# Patient Record
Sex: FEMALE | Race: WHITE | Employment: OTHER | URBAN - METROPOLITAN AREA
[De-identification: names, ages, dates, MRNs, and addresses within clinical notes are randomized per-mention and may not be internally consistent; named-entity substitution may affect disease eponyms.]

---

## 2017-01-13 ENCOUNTER — HOSPITAL ENCOUNTER (OUTPATIENT)
Dept: LAB | Age: 82
Discharge: HOME | End: 2017-01-13
Payer: MEDICARE

## 2017-01-13 ENCOUNTER — OFFICE VISIT (OUTPATIENT)
Dept: INTERNAL MEDICINE CLINIC | Age: 82
End: 2017-01-13

## 2017-01-13 VITALS
SYSTOLIC BLOOD PRESSURE: 109 MMHG | WEIGHT: 147 LBS | OXYGEN SATURATION: 94 % | TEMPERATURE: 98.2 F | HEIGHT: 66 IN | RESPIRATION RATE: 16 BRPM | HEART RATE: 78 BPM | BODY MASS INDEX: 23.63 KG/M2 | DIASTOLIC BLOOD PRESSURE: 61 MMHG

## 2017-01-13 DIAGNOSIS — I10 ESSENTIAL HYPERTENSION: ICD-10-CM

## 2017-01-13 DIAGNOSIS — R19.7 DIARRHEA, UNSPECIFIED TYPE: Chronic | ICD-10-CM

## 2017-01-13 DIAGNOSIS — E87.6 HYPOKALEMIA: ICD-10-CM

## 2017-01-13 DIAGNOSIS — G30.1 LATE ONSET ALZHEIMER'S DISEASE WITHOUT BEHAVIORAL DISTURBANCE (HCC): Primary | ICD-10-CM

## 2017-01-13 DIAGNOSIS — F02.80 LATE ONSET ALZHEIMER'S DISEASE WITHOUT BEHAVIORAL DISTURBANCE (HCC): Primary | ICD-10-CM

## 2017-01-13 DIAGNOSIS — F32.A DEPRESSION, UNSPECIFIED DEPRESSION TYPE: Chronic | ICD-10-CM

## 2017-01-13 DIAGNOSIS — R63.4 WEIGHT LOSS: ICD-10-CM

## 2017-01-13 PROCEDURE — 80061 LIPID PANEL: CPT

## 2017-01-13 PROCEDURE — 83735 ASSAY OF MAGNESIUM: CPT

## 2017-01-13 PROCEDURE — 84134 ASSAY OF PREALBUMIN: CPT

## 2017-01-13 PROCEDURE — 85025 COMPLETE CBC W/AUTO DIFF WBC: CPT

## 2017-01-13 PROCEDURE — 36415 COLL VENOUS BLD VENIPUNCTURE: CPT

## 2017-01-13 PROCEDURE — 80053 COMPREHEN METABOLIC PANEL: CPT

## 2017-01-13 RX ORDER — POTASSIUM CHLORIDE 750 MG/1
TABLET, FILM COATED, EXTENDED RELEASE ORAL
Qty: 30 TAB | Refills: 0 | Status: SHIPPED | OUTPATIENT
Start: 2017-01-13 | End: 2017-02-24 | Stop reason: SDUPTHER

## 2017-01-13 NOTE — MR AVS SNAPSHOT
Visit Information Date & Time Provider Department Dept. Phone Encounter #  
 1/13/2017  2:00 PM Nimco Maddox, [facility] [address] [phone] Follow-up Instructions Return in about 3 months (around 4/13/2017). Upcoming Health Maintenance Date Due ZOSTER VACCINE AGE 60> 4/23/1997 GLAUCOMA SCREENING Q2Y 4/23/2002 MEDICARE YEARLY EXAM 11/30/2016 Pneumococcal 65+ Low/Medium Risk (2 of 2 - PCV13) 1/27/2017 DTaP/Tdap/Td series (2 - Td) 1/27/2026 Allergies as of 1/13/2017  Review Complete On: 1/13/2017 By: Shyam Mercury III, DO Severity Noted Reaction Type Reactions Ciprofloxacin  08/05/2011    Itching Ditropan [Oxybutynin Chloride]  08/16/2011    Itching Lisinopril  08/05/2011    Itching Sulfa (Sulfonamide Antibiotics)  08/05/2011    Itching Toprol Xl [Metoprolol Succinate]  08/05/2011    Itching Current Immunizations  Never Reviewed Name Date Pneumococcal Polysaccharide (PPSV-23) 1/27/2016 Tdap 1/27/2016 Not reviewed this visit You Were Diagnosed With   
  
 Codes Comments Late onset Alzheimer's disease without behavioral disturbance    -  Primary ICD-10-CM: G30.1, F02.80 ICD-9-CM: 331.0, 294.10 Essential hypertension     ICD-10-CM: I10 
ICD-9-CM: 401.9 Weight loss     ICD-10-CM: R63.4 ICD-9-CM: 783.21 Depression, unspecified depression type     ICD-10-CM: F32.9 ICD-9-CM: 253 Diarrhea, unspecified type     ICD-10-CM: R19.7 ICD-9-CM: 787.91 Hypokalemia     ICD-10-CM: E87.6 ICD-9-CM: 276.8 Vitals BP Pulse Temp Resp Height(growth percentile) Weight(growth percentile) 109/61 (BP 1 Location: Left arm, BP Patient Position: Sitting) 78 98.2 °F (36.8 °C) (Oral) 16 5' 5.5\" (1.664 m) 147 lb (66.7 kg) SpO2 BMI OB Status Smoking Status 94% 24.09 kg/m2 Postmenopausal Never Smoker Vitals History BMI and BSA Data Body Mass Index Body Surface Area 24.09 kg/m 2 1.76 m 2 Preferred Pharmacy Pharmacy Name Phone [address], [address] [place] [address] [place] [phone] Your Updated Medication List  
  
   
This list is accurate as of: 1/13/17  2:54 PM.  Always use your most recent med list. amLODIPine 5 mg tablet Commonly known as:  Kyra Quesada TAKE 1 TABLET BY MOUTH DAILY FOR BLOOD PRESSURE. ARICEPT 10 mg tablet Generic drug:  donepezil Take 10 mg by mouth nightly. CALCIUM 500 PO Take  by mouth daily. escitalopram oxalate 10 mg tablet Commonly known as:  Heath Jefferson Take 1 Tab by mouth daily. fluorometholone 0.1 % ophthalmic suspension Commonly known as: [facility] Administer 1 Drop to both eyes two (2) times a day. hydroCHLOROthiazide 25 mg tablet Commonly known as:  HYDRODIURIL  
TAKE 1 TABLET BY MOUTH DAILY FOR BLOOD PRESSURE.  
  
 ibuprofen 200 mg tablet Commonly known as:  MOTRIN Take  by mouth.  
  
 meloxicam 15 mg tablet Commonly known as:  MOBIC Take 15 mg by mouth daily. memantine 5 mg tablet Commonly known as:  Lerma Brood TAKE 1 TABLET BY MOUTH DAILY. omega-3 fatty acids-vitamin e 1,000 mg Cap Take 1 Cap by mouth daily. potassium chloride SR 10 mEq tablet Commonly known as:  KLOR-CON 10  
TAKE 1 TABLET BY MOUTH DAILY FOR POTASSIUM. suvorexant 10 mg tablet Commonly known as:  Cher Crow Take 1 Tab by mouth nightly as needed for Insomnia. Max Daily Amount: 10 mg.  
  
 traMADol 50 mg tablet Commonly known as:  ULTRAM  
Take 1 Tab by mouth every six (6) hours as needed for Pain. Max Daily Amount: 200 mg. Indications: PAIN Dea Bhakta Commonly known as:  ULTRA-LIGHT ROLLATOR Dx: F03.90 M19.90 M25.551 Prescriptions Sent to Pharmacy Refills  
 potassium chloride SR (KLOR-CON 10) 10 mEq tablet 0 Sig: TAKE 1 TABLET BY MOUTH DAILY FOR POTASSIUM. Class: Normal  
 Pharmacy: [id number] [facility] Last  #: [phone] We Performed the Following CBC WITH AUTOMATED DIFF [41794 CPT(R)] LIPID PANEL [59483 CPT(R)] MAGNESIUM [id number] CPT(R)] METABOLIC PANEL, COMPREHENSIVE [85879 CPT(R)] PREALBUMIN [31647 CPT(R)] REFERRAL TO GASTROENTEROLOGY [QQG58 Custom] Follow-up Instructions Return in about 3 months (around 4/13/2017). Referral Information Referral ID Referred By Referred To  
  
 [id number] [facility] [address] [place] [address] Visits Status Start Date End Date 1 New Request 1/13/17 1/13/18 If your referral has a status of pending review or denied, additional information will be sent to support the outcome of this decision. Patient Instructions High-Calorie and High-Protein Diet: Care Instructions Your Care Instructions A high-calorie, high-protein diet gives you more energy and extra nutrition to help your body heal. Your doctor and dietitian can help you design a diet based on your health and what you prefer to eat. Always talk with your doctor or dietitian before you make changes in your diet. Follow-up care is a key part of your treatment and safety. Be sure to make and go to all appointments, and call your doctor if you are having problems. Its also a good idea to know your test results and keep a list of the medicines you take. How can you care for yourself at home? · Eat three meals a day, plus snacks in between and at bedtime. · Include favorite foods in your meals. This will help make meals more pleasant. · Drink your beverage at the end of the meal, because drinking before or during the meal can fill you up. · Eat high-protein foods, such as: ¨ Meat, fish, and poultry. ¨ Milk and milk products.  Add powdered milk to other foods (such as pudding or soups) to boost the protein. ¨ Eggs. ¨ Cooked dried beans and peas. ¨ Peanut butter, nuts, and seeds. ¨ Tofu. ¨ Cheeses. ¨ Protein bars. · Eat high-calorie foods, such as: 
¨ Butter, honey, and brown sugar, added to foods to make them taste better. ¨ Oils, sauces, and gravies. ¨ Peanut butter. ¨ Whole milk, yogurt, mayonnaise, and sour cream. 
¨ Granola cereal with fruit and granola bars. ¨ Muffins, pancakes, waffles, and other breads. ¨ Milkshakes, puddings, and custard. · Try high-energy drinks, such as Ensure, Boost, or instant breakfast drinks, if you have trouble eating solid foods. They will give you both calories and protein. Soups and smoothies also are good sources of nutrition. · Keep snacks around that are easy to eat, such as pudding, energy bars, ice cream, and flavored ice pops. · If you can, take a walk before you eat, to make you hungrier. · Do not waste energy eating foods that do not give you much nutrition, such as potato chips, candy bars, and soft drinks. · Drink plenty of fluids. If you have kidney, heart, or liver disease and have to limit fluids, talk with your doctor before you increase the amount of fluids you drink. Where can you learn more? Go to http://jodi-edin.info/. Enter C754 in the search box to learn more about \"High-Calorie and High-Protein Diet: Care Instructions. \" Current as of: July 26, 2016 Content Version: 11.1 © 3340-3526 [employer]. Care instructions adapted under license by [employer] (which disclaims liability or warranty for this information). If you have questions about a medical condition or this instruction, always ask your healthcare professional. [address] any warranty or liability for your use of this information. Introducing [facility]!    
 [employer] introduces I-Stand patient portal. Now you can access parts of your medical record, email your doctor's office, and request medication refills online. 1. In your internet browser, go to [email or web address] 2. Click on the First Time User? Click Here link in the Sign In box. You will see the New Member Sign Up page. 3. Enter your [employer] Access Code exactly as it appears below. You will not need to use this code after youve completed the sign-up process. If you do not sign up before the expiration date, you must request a new code. · [employer] Access Code: [email or web address] Expires: 4/13/2017  2:54 PM 
 
4. Enter the last four digits of your Social Security Number (xxxx) and Date of Birth (mm/dd/yyyy) as indicated and click Submit. You will be taken to the next sign-up page. 5. Create a [employer] ID. This will be your [employer] login ID and cannot be changed, so think of one that is secure and easy to remember. 6. Create a [employer] password. You can change your password at any time. 7. Enter your Password Reset Question and Answer. This can be used at a later time if you forget your password. 8. Enter your e-mail address. You will receive e-mail notification when new information is available in [address] 9. Click Sign Up. You can now view and download portions of your medical record. 10. Click the Download Summary menu link to download a portable copy of your medical information. If you have questions, please visit the Frequently Asked Questions section of the [employer] website. Remember, [employer] is NOT to be used for urgent needs. For medical emergencies, dial 911. Now available from your iPhone and Android! Please provide this summary of care documentation to your next provider. Your primary care clinician is listed as Karrie Cotto If you have any questions after today's visit, please call [phone].

## 2017-01-13 NOTE — PROGRESS NOTES
Reviewed record in preparation for visit and have obtained necessary documentation. Identified pt with two pt identifiers(name and ). Chief Complaint   Patient presents with    Hypertension     follow up       Health Maintenance Due   Topic Date Due    ZOSTER VACCINE AGE 60>  1997    GLAUCOMA SCREENING Q2Y  2002    MEDICARE YEARLY EXAM  2016       Ms. Abel has a reminder for a \"due or due soon\" health maintenance. I have asked that she discuss health maintenance topic(s) due with Her  primary care provider. Coordination of Care Questionnaire:  :     1) Have you been to an emergency room, urgent care clinic since your last visit? no   Hospitalized since your last visit? no             2) Have you seen or consulted any other health care providers outside of [address] [place] [address] since your last visit? no  (Include any pap smears or colon screenings in this section.)      Patient is accompanied by self I have received verbal consent from [place] to discuss any/all medical information while they are present in the room.

## 2017-01-13 NOTE — PROGRESS NOTES
Jose Luis Morfin is a 80 y.o. female who presents for evaluation of routine follow up. Last seen by me oct 13, started lexapro then for depression, which has helped nicely. Has lost another 10 lbs since then though. Not much appetite, and often forgets to eat. Denies any pains, cough or other symptoms. Much less diarrhea as well.       ROS:  Constitutional: negative for fevers, chills, anorexia and weight loss  Eyes:   negative for visual disturbance and irritation  ENT:   negative for tinnitus,sore throat,nasal congestion,ear pain,hoarseness  Respiratory:  negative for cough, hemoptysis, dyspnea,wheezing  CV:   negative for chest pain, palpitations, lower extremity edema  GI:   negative for nausea, vomiting,  abdominal pain,melena.   ++diarrhea, but much less  Genitourinary: negative for frequency, dysuria and hematuria  Musculoskel: negative for myalgias, arthralgias, back pain, muscle weakness, joint pain  Neurological:  negative for headaches, dizziness, focal weakness, numbness  Psychiatric:     Negative for depression or anxiety      Past Medical History   Diagnosis Date    Arthritis      hands    Hypertension     Incontinence of urine     Other ill-defined conditions(799.89)      freq UTIs    Thromboembolus ([facility] [address] 5-2011     left leg    Unspecified adverse effect of anesthesia      PONV       Past Surgical History   Procedure Laterality Date    Vascular surgery procedure unlist  6-2011     left leg vein procedure DVT    Pr breast surgery procedure unlisted       right breast bx x2    Hx urological  2006     bladder sling    Hx cholecystectomy      Hx cataract removal  2008     oze       Family History   Problem Relation Age of Onset    Cancer Father      lung, prostate    Heart Disease Brother     No Known Problems Mother        Social History     Social History    Marital status:      Spouse name: N/A    Number of children: N/A    Years of education: N/A     Occupational History    Not on file. Social History Main Topics    Smoking status: Never Smoker    Smokeless tobacco: Not on file    Alcohol use No    Drug use: No    Sexual activity: No     Other Topics Concern    Not on file     Social History Narrative            Visit Vitals    /61 (BP 1 Location: Left arm, BP Patient Position: Sitting)    Pulse 78    Temp 98.2 °F (36.8 °C) (Oral)    Resp 16    Ht 5' 5.5\" (1.664 m)    Wt 147 lb (66.7 kg)    SpO2 94%    BMI 24.09 kg/m2       Physical Examination:   General - Well appearing female  HEENT - PERRL, TM no erythema/opacification, normal nasal turbinates, no oropharyngeal erythema or exudate, MMM  Neck - supple, no bruits, no thyroidomegaly, no lymphadenopathy  Pulm - clear to auscultation bilaterally  Cardio - RRR, normal S1 S2, no murmur  Abd - soft, nontender, no masses, no HSM  Extrem - no edema, +2 distal pulses  Neuro-  No focal deficits, CN intact     Assessment/Plan:    1. Continued weight loss--check cbc, cmp, prealbumin. Denies any dysphagia, odynophagia or other complaints. Had ct abd/pelvis that was normal.  2.  Diarrhea--much improved, but persists. Doubt the cause of her weight loss, but will refer to gi for any further thoughts. 3.  htn--continue norvasc, hctz  4. Depression--much improved with lexapro  5. Dementia--continue aricept and namenda  6.   Hypokalemia--check cmp, mg    rtc 3 months        Tiara Gains III, DO

## 2017-01-13 NOTE — LETTER
2/10/2017 8:51 AM 
 
Ms. Garcia [address] [id number] Dear Tl Duran: 
 
Please find your most recent results below. Resulted Orders CBC WITH AUTOMATED DIFF Result Value Ref Range WBC 8.2 3.4 - 10.8 x10E3/uL  
 RBC 5.10 3.77 - 5.28 x10E6/uL HGB 15.3 11.1 - 15.9 g/dL HCT 45.1 34.0 - 46.6 % MCV 88 79 - 97 fL  
 MCH 30.0 26.6 - 33.0 pg  
 MCHC 33.9 31.5 - 35.7 g/dL  
 RDW 14.1 12.3 - 15.4 % PLATELET 619 158 - 409 x10E3/uL NEUTROPHILS 69 % Lymphocytes 19 % MONOCYTES 10 % EOSINOPHILS 1 % BASOPHILS 1 %  
 ABS. NEUTROPHILS 5.7 1.4 - 7.0 x10E3/uL Abs Lymphocytes 1.6 0.7 - 3.1 x10E3/uL  
 ABS. MONOCYTES 0.8 0.1 - 0.9 x10E3/uL  
 ABS. EOSINOPHILS 0.1 0.0 - 0.4 x10E3/uL  
 ABS. BASOPHILS 0.1 0.0 - 0.2 x10E3/uL IMMATURE GRANULOCYTES 0 %  
 ABS. IMM. GRANS. 0.0 0.0 - 0.1 x10E3/uL Narrative Performed at:  [address]  [id number] : Usha Santizo MD, Phone:  [phone] METABOLIC PANEL, COMPREHENSIVE Result Value Ref Range Glucose 136 (H) 65 - 99 mg/dL BUN 13 8 - 27 mg/dL Creatinine 1.06 (H) 0.57 - 1.00 mg/dL GFR est non-AA 50 (L) >59 mL/min/1.73 GFR est AA 58 (L) >59 mL/min/1.73  
 BUN/Creatinine ratio 12 11 - 26 Sodium 145 (H) 134 - 144 mmol/L Potassium 3.4 (L) 3.5 - 5.2 mmol/L Chloride 101 96 - 106 mmol/L  
 CO2 26 18 - 29 mmol/L Calcium 9.4 8.7 - 10.3 mg/dL Protein, total 6.2 6.0 - 8.5 g/dL Albumin 3.5 3.5 - 4.8 g/dL GLOBULIN, TOTAL 2.7 1.5 - 4.5 g/dL A-G Ratio 1.3 1.1 - 2.5 Bilirubin, total 0.5 0.0 - 1.2 mg/dL Alk. phosphatase 86 39 - 117 IU/L  
 AST (SGOT) 33 0 - 40 IU/L  
 ALT (SGPT) 18 0 - 32 IU/L Narrative Performed at:  [address]  [id number] : Usha Santizo MD, Phone:  [phone] LIPID PANEL Result Value Ref Range Cholesterol, total 192 100 - 199 mg/dL Triglyceride 129 0 - 149 mg/dL HDL Cholesterol 48 >39 mg/dL VLDL, calculated 26 5 - 40 mg/dL LDL, calculated 118 (H) 0 - 99 mg/dL Narrative Performed at:  [address]  [id number] : Kiki Greene MD, Phone:  [phone] MAGNESIUM Result Value Ref Range Magnesium 2.3 1.6 - 2.3 mg/dL Narrative Performed at:  [address]  [id number] : Kiki Greene MD, Phone:  [phone] PREALBUMIN Result Value Ref Range Prealbumin 9 9 - 32 mg/dL Narrative Performed at:  [address]  [id number] : Kiki Greene MD, Phone:  [phone] RECOMMENDATIONS: 
We have attempted to contact you by phone regarding your results. Labs look pretty good, nothing to explain the weight loss. Potassium is just below normal at 3.4. If she is taking a supplement continue it, if she is not taking the supplement, i think that is ok as well. Otherwise, continue all meds same. Please call me if you have any questions: [phone] Sincerely, Leatha Sandoval III, DO

## 2017-01-13 NOTE — PATIENT INSTRUCTIONS
High-Calorie and High-Protein Diet: Care Instructions  Your Care Instructions  A high-calorie, high-protein diet gives you more energy and extra nutrition to help your body heal. Your doctor and dietitian can help you design a diet based on your health and what you prefer to eat. Always talk with your doctor or dietitian before you make changes in your diet. Follow-up care is a key part of your treatment and safety. Be sure to make and go to all appointments, and call your doctor if you are having problems. Its also a good idea to know your test results and keep a list of the medicines you take. How can you care for yourself at home? · Eat three meals a day, plus snacks in between and at bedtime. · Include favorite foods in your meals. This will help make meals more pleasant. · Drink your beverage at the end of the meal, because drinking before or during the meal can fill you up. · Eat high-protein foods, such as:  ¨ Meat, fish, and poultry. ¨ Milk and milk products. Add powdered milk to other foods (such as pudding or soups) to boost the protein. ¨ Eggs. ¨ Cooked dried beans and peas. ¨ Peanut butter, nuts, and seeds. ¨ Tofu. ¨ Cheeses. ¨ Protein bars. · Eat high-calorie foods, such as:  ¨ Butter, honey, and brown sugar, added to foods to make them taste better. ¨ Oils, sauces, and gravies. ¨ Peanut butter. ¨ Whole milk, yogurt, mayonnaise, and sour cream.  ¨ Granola cereal with fruit and granola bars. ¨ Muffins, pancakes, waffles, and other breads. ¨ Milkshakes, puddings, and custard. · Try high-energy drinks, such as Ensure, Boost, or instant breakfast drinks, if you have trouble eating solid foods. They will give you both calories and protein. Soups and smoothies also are good sources of nutrition. · Keep snacks around that are easy to eat, such as pudding, energy bars, ice cream, and flavored ice pops. · If you can, take a walk before you eat, to make you hungrier.   · Do not waste energy eating foods that do not give you much nutrition, such as potato chips, candy bars, and soft drinks. · Drink plenty of fluids. If you have kidney, heart, or liver disease and have to limit fluids, talk with your doctor before you increase the amount of fluids you drink. Where can you learn more? Go to http://jodi-edin.info/. Enter L814 in the search box to learn more about \"High-Calorie and High-Protein Diet: Care Instructions. \"  Current as of: July 26, 2016  Content Version: 11.1  © 1147-6132 [employer]. Care instructions adapted under license by [employer] (which disclaims liability or warranty for this information). If you have questions about a medical condition or this instruction, always ask your healthcare professional. [address] any warranty or liability for your use of this information.

## 2017-01-14 LAB
ALBUMIN SERPL-MCNC: 3.5 G/DL (ref 3.5–4.8)
ALBUMIN/GLOB SERPL: 1.3 {RATIO} (ref 1.1–2.5)
ALP SERPL-CCNC: 86 IU/L (ref 39–117)
ALT SERPL-CCNC: 18 IU/L (ref 0–32)
AST SERPL-CCNC: 33 IU/L (ref 0–40)
BASOPHILS # BLD AUTO: 0.1 X10E3/UL (ref 0–0.2)
BASOPHILS NFR BLD AUTO: 1 %
BILIRUB SERPL-MCNC: 0.5 MG/DL (ref 0–1.2)
BUN SERPL-MCNC: 13 MG/DL (ref 8–27)
BUN/CREAT SERPL: 12 (ref 11–26)
CALCIUM SERPL-MCNC: 9.4 MG/DL (ref 8.7–10.3)
CHLORIDE SERPL-SCNC: 101 MMOL/L (ref 96–106)
CHOLEST SERPL-MCNC: 192 MG/DL (ref 100–199)
CO2 SERPL-SCNC: 26 MMOL/L (ref 18–29)
CREAT SERPL-MCNC: 1.06 MG/DL (ref 0.57–1)
EOSINOPHIL # BLD AUTO: 0.1 X10E3/UL (ref 0–0.4)
EOSINOPHIL NFR BLD AUTO: 1 %
ERYTHROCYTE [DISTWIDTH] IN BLOOD BY AUTOMATED COUNT: 14.1 % (ref 12.3–15.4)
GLOBULIN SER CALC-MCNC: 2.7 G/DL (ref 1.5–4.5)
GLUCOSE SERPL-MCNC: 136 MG/DL (ref 65–99)
HCT VFR BLD AUTO: 45.1 % (ref 34–46.6)
HDLC SERPL-MCNC: 48 MG/DL
HGB BLD-MCNC: 15.3 G/DL (ref 11.1–15.9)
IMM GRANULOCYTES # BLD: 0 X10E3/UL (ref 0–0.1)
IMM GRANULOCYTES NFR BLD: 0 %
LDLC SERPL CALC-MCNC: 118 MG/DL (ref 0–99)
LYMPHOCYTES # BLD AUTO: 1.6 X10E3/UL (ref 0.7–3.1)
LYMPHOCYTES NFR BLD AUTO: 19 %
MAGNESIUM SERPL-MCNC: 2.3 MG/DL (ref 1.6–2.3)
MCH RBC QN AUTO: 30 PG (ref 26.6–33)
MCHC RBC AUTO-ENTMCNC: 33.9 G/DL (ref 31.5–35.7)
MCV RBC AUTO: 88 FL (ref 79–97)
MONOCYTES # BLD AUTO: 0.8 X10E3/UL (ref 0.1–0.9)
MONOCYTES NFR BLD AUTO: 10 %
NEUTROPHILS # BLD AUTO: 5.7 X10E3/UL (ref 1.4–7)
NEUTROPHILS NFR BLD AUTO: 69 %
PLATELET # BLD AUTO: 226 X10E3/UL (ref 150–379)
POTASSIUM SERPL-SCNC: 3.4 MMOL/L (ref 3.5–5.2)
PREALB SERPL-MCNC: 9 MG/DL (ref 9–32)
PROT SERPL-MCNC: 6.2 G/DL (ref 6–8.5)
RBC # BLD AUTO: 5.1 X10E6/UL (ref 3.77–5.28)
SODIUM SERPL-SCNC: 145 MMOL/L (ref 134–144)
TRIGL SERPL-MCNC: 129 MG/DL (ref 0–149)
VLDLC SERPL CALC-MCNC: 26 MG/DL (ref 5–40)
WBC # BLD AUTO: 8.2 X10E3/UL (ref 3.4–10.8)

## 2017-01-15 NOTE — PROGRESS NOTES
Labs look pretty good, nothing to explain the weight loss. Potassium is just below normal at 3.4. If she is taking a supplement continue it, if she is not taking the supplement, i think that is ok as well. Otherwise, continue all meds same.

## 2017-01-19 ENCOUNTER — DOCUMENTATION ONLY (OUTPATIENT)
Dept: SURGERY | Age: 82
End: 2017-01-19

## 2017-01-27 ENCOUNTER — HOSPITAL ENCOUNTER (OUTPATIENT)
Dept: LAB | Age: 82
Discharge: HOME | End: 2017-01-27
Payer: MEDICARE

## 2017-01-27 ENCOUNTER — OFFICE VISIT (OUTPATIENT)
Dept: INTERNAL MEDICINE CLINIC | Age: 82
End: 2017-01-27

## 2017-01-27 VITALS
RESPIRATION RATE: 16 BRPM | TEMPERATURE: 97.9 F | OXYGEN SATURATION: 94 % | HEART RATE: 98 BPM | HEIGHT: 66 IN | DIASTOLIC BLOOD PRESSURE: 62 MMHG | BODY MASS INDEX: 23.14 KG/M2 | WEIGHT: 144 LBS | SYSTOLIC BLOOD PRESSURE: 106 MMHG

## 2017-01-27 DIAGNOSIS — N39.0 URINARY TRACT INFECTION WITHOUT HEMATURIA, SITE UNSPECIFIED: Primary | ICD-10-CM

## 2017-01-27 LAB
BILIRUB UR QL STRIP: NEGATIVE
GLUCOSE UR-MCNC: NEGATIVE MG/DL
KETONES P FAST UR STRIP-MCNC: NORMAL MG/DL
PH UR STRIP: 6.5 [PH] (ref 4.6–8)
PROT UR QL STRIP: NORMAL MG/DL
SP GR UR STRIP: 1.02 (ref 1–1.03)
UA UROBILINOGEN AMB POC: NORMAL (ref 0.2–1)
URINALYSIS CLARITY POC: NORMAL
URINALYSIS COLOR POC: YELLOW
URINE BLOOD POC: NEGATIVE
URINE LEUKOCYTES POC: NORMAL
URINE NITRITES POC: POSITIVE

## 2017-01-27 PROCEDURE — 87086 URINE CULTURE/COLONY COUNT: CPT

## 2017-01-27 RX ORDER — NITROFURANTOIN 25; 75 MG/1; MG/1
100 CAPSULE ORAL 2 TIMES DAILY
Qty: 10 CAP | Refills: 0 | Status: SHIPPED | OUTPATIENT
Start: 2017-01-27 | End: 2017-02-01

## 2017-01-27 NOTE — PATIENT INSTRUCTIONS
Urinary Tract Infection in Women: Care Instructions  Your Care Instructions    A urinary tract infection, or UTI, is a general term for an infection anywhere between the kidneys and the urethra (where urine comes out). Most UTIs are bladder infections. They often cause pain or burning when you urinate. UTIs are caused by bacteria and can be cured with antibiotics. Be sure to complete your treatment so that the infection goes away. Follow-up care is a key part of your treatment and safety. Be sure to make and go to all appointments, and call your doctor if you are having problems. It's also a good idea to know your test results and keep a list of the medicines you take. How can you care for yourself at home? · Take your antibiotics as directed. Do not stop taking them just because you feel better. You need to take the full course of antibiotics. · Drink extra water and other fluids for the next day or two. This may help wash out the bacteria that are causing the infection. (If you have kidney, heart, or liver disease and have to limit fluids, talk with your doctor before you increase your fluid intake.)  · Avoid drinks that are carbonated or have caffeine. They can irritate the bladder. · Urinate often. Try to empty your bladder each time. · To relieve pain, take a hot bath or lay a heating pad set on low over your lower belly or genital area. Never go to sleep with a heating pad in place. To prevent UTIs  · Drink plenty of water each day. This helps you urinate often, which clears bacteria from your system. (If you have kidney, heart, or liver disease and have to limit fluids, talk with your doctor before you increase your fluid intake.)  · Consider adding cranberry juice to your diet. · Urinate when you need to. · Urinate right after you have sex. · Change sanitary pads often. · Avoid douches, bubble baths, feminine hygiene sprays, and other feminine hygiene products that have deodorants.   · After going to the bathroom, wipe from front to back. When should you call for help? Call your doctor now or seek immediate medical care if:  · Symptoms such as fever, chills, nausea, or vomiting get worse or appear for the first time. · You have new pain in your back just below your rib cage. This is called flank pain. · There is new blood or pus in your urine. · You have any problems with your antibiotic medicine. Watch closely for changes in your health, and be sure to contact your doctor if:  · You are not getting better after taking an antibiotic for 2 days. · Your symptoms go away but then come back. Where can you learn more? Go to http://jodi-edin.info/. Enter W547 in the search box to learn more about \"Urinary Tract Infection in Women: Care Instructions. \"  Current as of: August 12, 2016  Content Version: 11.1  © 3480-0185 [employer]Closed). Care instructions adapted under license by [employer] (which disclaims liability or warranty for this information). If you have questions about a medical condition or this instruction, always ask your healthcare professional. [address] any warranty or liability for your use of this information.

## 2017-01-27 NOTE — PROGRESS NOTES
Raoul Gamble is a 80 y.o. female who presents for evaluation of increased lethargy. Accompanied with daughter, who also states that pt has been using more toilet paper than usual recently. ROS:  Constitutional: negative for fevers, chills, anorexia and weight loss  Eyes:   negative for visual disturbance and irritation  ENT:   negative for tinnitus,sore throat,nasal congestion,ear pain,hoarseness  Respiratory:  negative for cough, hemoptysis, dyspnea,wheezing  CV:   negative for chest pain, palpitations, lower extremity edema  GI:   negative for nausea, vomiting, diarrhea, abdominal pain,melena  Genitourinary: negative for frequency, dysuria and hematuria  Musculoskel: negative for myalgias, arthralgias, back pain, muscle weakness, joint pain  Neurological:  negative for headaches, dizziness, focal weakness, numbness  Psychiatric:     Negative for depression or anxiety      Past Medical History   Diagnosis Date    Arthritis      hands    Hypertension     Incontinence of urine     Other ill-defined conditions(799.89)      freq UTIs    Thromboembolus ([facility] [address] 5-2011     left leg    Unspecified adverse effect of anesthesia      PONV       Past Surgical History   Procedure Laterality Date    Vascular surgery procedure unlist  6-2011     left leg vein procedure DVT    Pr breast surgery procedure unlisted       right breast bx x2    Hx urological  2006     bladder sling    Hx cholecystectomy      Hx cataract removal  2008     zoe       Family History   Problem Relation Age of Onset    Cancer Father      lung, prostate    Heart Disease Brother     No Known Problems Mother        Social History     Social History    Marital status:      Spouse name: N/A    Number of children: N/A    Years of education: N/A     Occupational History    Not on file.      Social History Main Topics    Smoking status: Never Smoker    Smokeless tobacco: Never Used    Alcohol use No    Drug use: No    Sexual activity: No     Other Topics Concern    Not on file     Social History Narrative            Visit Vitals    /62 (BP 1 Location: Left arm, BP Patient Position: Sitting)    Pulse 98    Temp 97.9 °F (36.6 °C) (Oral)    Resp 16    Ht 5' 5.5\" (1.664 m)    Wt 144 lb (65.3 kg)    SpO2 94%    BMI 23.6 kg/m2       Physical Examination:   General - Well appearing female  HEENT - PERRL, TM no erythema/opacification, normal nasal turbinates, no oropharyngeal erythema or exudate, MMM  Neck - supple, no bruits, no thyroidomegaly, no lymphadenopathy  Pulm - clear to auscultation bilaterally  Cardio - RRR, normal S1 S2, no murmur  Abd - soft, nontender, no masses, no HSM  Extrem - no edema, +2 distal pulses  Neuro-  No focal deficits, CN intact     Assessment/Plan:    1.  uti--send cx. rx for macrobid. 2. Lethargy--suspect uti contributing. 3.  Dementia--continue namenda    rtc 3 months for regular visit.         Lawrence Pap III, DO

## 2017-01-27 NOTE — PROGRESS NOTES
Reviewed record in preparation for visit and have obtained necessary documentation. Identified pt with two pt identifiers(name and ). No chief complaint on file. Health Maintenance Due   Topic Date Due    GLAUCOMA SCREENING Q2Y  2002    MEDICARE YEARLY EXAM  2016    Pneumococcal 65+ Low/Medium Risk (2 of 2 - PCV13) 2017       Ms. Abel has a reminder for a \"due or due soon\" health maintenance. I have asked that she discuss health maintenance topic(s) due with Her  primary care provider. Coordination of Care Questionnaire:  :     1) Have you been to an emergency room, urgent care clinic since your last visit? no   Hospitalized since your last visit? no             2) Have you seen or consulted any other health care providers outside of [address] [place] [address] since your last visit? no  (Include any pap smears or colon screenings in this section.)      Patient is accompanied by patient I have received verbal consent from [facility] to discuss any/all medical information while they are present in the room.

## 2017-01-29 LAB
BACTERIA UR CULT: NORMAL
BACTERIA UR CULT: NORMAL

## 2017-01-29 RX ORDER — AMOXICILLIN 500 MG/1
500 CAPSULE ORAL 2 TIMES DAILY
Qty: 20 CAP | Refills: 0 | Status: SHIPPED | OUTPATIENT
Start: 2017-01-29 | End: 2017-02-08

## 2017-02-01 NOTE — PROGRESS NOTES
I called and spoke with Taniya Shaver (on HIPPA). She was made aware of lab results and recommendations. She verbalized understanding.

## 2017-04-14 ENCOUNTER — HOSPITAL ENCOUNTER (OUTPATIENT)
Dept: LAB | Age: 82
Discharge: HOME | End: 2017-04-14
Payer: MEDICARE

## 2017-04-14 ENCOUNTER — OFFICE VISIT (OUTPATIENT)
Dept: INTERNAL MEDICINE CLINIC | Age: 82
End: 2017-04-14

## 2017-04-14 VITALS
SYSTOLIC BLOOD PRESSURE: 97 MMHG | TEMPERATURE: 98 F | HEART RATE: 78 BPM | DIASTOLIC BLOOD PRESSURE: 54 MMHG | BODY MASS INDEX: 23.63 KG/M2 | WEIGHT: 147 LBS | RESPIRATION RATE: 16 BRPM | HEIGHT: 66 IN | OXYGEN SATURATION: 94 %

## 2017-04-14 DIAGNOSIS — G30.1 LATE ONSET ALZHEIMER'S DISEASE WITHOUT BEHAVIORAL DISTURBANCE (HCC): ICD-10-CM

## 2017-04-14 DIAGNOSIS — R19.7 DIARRHEA, UNSPECIFIED TYPE: Chronic | ICD-10-CM

## 2017-04-14 DIAGNOSIS — N39.0 URINARY TRACT INFECTION WITHOUT HEMATURIA, SITE UNSPECIFIED: Primary | ICD-10-CM

## 2017-04-14 DIAGNOSIS — I10 ESSENTIAL HYPERTENSION: ICD-10-CM

## 2017-04-14 DIAGNOSIS — F32.A DEPRESSION, UNSPECIFIED DEPRESSION TYPE: Chronic | ICD-10-CM

## 2017-04-14 DIAGNOSIS — F02.80 LATE ONSET ALZHEIMER'S DISEASE WITHOUT BEHAVIORAL DISTURBANCE (HCC): ICD-10-CM

## 2017-04-14 LAB
BILIRUB UR QL STRIP: NEGATIVE
GLUCOSE UR-MCNC: NEGATIVE MG/DL
KETONES P FAST UR STRIP-MCNC: NEGATIVE MG/DL
PH UR STRIP: 7 [PH] (ref 4.6–8)
PROT UR QL STRIP: NORMAL MG/DL
SP GR UR STRIP: 1.02 (ref 1–1.03)
UA UROBILINOGEN AMB POC: NORMAL (ref 0.2–1)
URINALYSIS CLARITY POC: NORMAL
URINALYSIS COLOR POC: YELLOW
URINE BLOOD POC: NEGATIVE
URINE LEUKOCYTES POC: NORMAL
URINE NITRITES POC: NEGATIVE

## 2017-04-14 PROCEDURE — 87186 SC STD MICRODIL/AGAR DIL: CPT

## 2017-04-14 PROCEDURE — 87077 CULTURE AEROBIC IDENTIFY: CPT

## 2017-04-14 PROCEDURE — 87088 URINE BACTERIA CULTURE: CPT

## 2017-04-14 PROCEDURE — 87086 URINE CULTURE/COLONY COUNT: CPT

## 2017-04-14 RX ORDER — AMLODIPINE BESYLATE 5 MG/1
TABLET ORAL
Qty: 30 TAB | Refills: 9 | Status: SHIPPED | OUTPATIENT
Start: 2017-04-14

## 2017-04-14 RX ORDER — NITROFURANTOIN 25; 75 MG/1; MG/1
100 CAPSULE ORAL 2 TIMES DAILY
Qty: 6 CAP | Refills: 0 | Status: SHIPPED | OUTPATIENT
Start: 2017-04-14 | End: 2017-04-17

## 2017-04-14 RX ORDER — HYDROCHLOROTHIAZIDE 25 MG/1
TABLET ORAL
Qty: 30 TAB | Refills: 9 | Status: ON HOLD | OUTPATIENT
Start: 2017-04-14 | End: 2017-05-28

## 2017-04-14 NOTE — TELEPHONE ENCOUNTER
Call completed to Mary Jo Estradadaugher of patient), two patient identifiers verified. Eduarda Truong advised that all PA's are processed on receipt and to allow 3 to 5 business days for a response. Eduarda Truong verbalized an understanding.

## 2017-04-14 NOTE — TELEPHONE ENCOUNTER
Patient's daughter, Shanelle Meyers states she needs a call back today in reference to prescription written for IBS today needs Prior [employer] & they would like to get this medication today for the patient. Please call to advise.  Thank you

## 2017-04-14 NOTE — PROGRESS NOTES
Dov Allen is a 80 y.o. female who presents for evaluation of routine follow up. Last seen by me jan 27. Doing well, has gained 3 lbs. Biggest concern is continued diarrhea, has become explosive. 2-3x daily now. No bleeding noted. ROS:  Constitutional: negative for fevers, chills, anorexia and weight loss  Eyes:   negative for visual disturbance and irritation  ENT:   negative for tinnitus,sore throat,nasal congestion,ear pain,hoarseness  Respiratory:  negative for cough, hemoptysis, dyspnea,wheezing  CV:   negative for chest pain, palpitations, lower extremity edema  GI:   negative for nausea, vomiting, abdominal pain,melena.  ++diarrhea  Genitourinary: negative for frequency, dysuria and hematuria  Musculoskel: negative for myalgias, arthralgias, back pain, muscle weakness, joint pain  Neurological:  negative for headaches, dizziness, focal weakness, numbness  Psychiatric:     Negative for depression or anxiety      Past Medical History:   Diagnosis Date    Arthritis     hands    Hypertension     Incontinence of urine     Other ill-defined conditions     freq UTIs    Thromboembolus ([facility] [address] 5-2011    left leg    Unspecified adverse effect of anesthesia     PONV       Past Surgical History:   Procedure Laterality Date    BREAST SURGERY PROCEDURE UNLISTED      right breast bx x2    HX CATARACT REMOVAL  2008    zoe    HX CHOLECYSTECTOMY      HX UROLOGICAL  2006    bladder sling    VASCULAR SURGERY PROCEDURE UNLIST  6-2011    left leg vein procedure DVT       Family History   Problem Relation Age of Onset    Cancer Father      lung, prostate    Heart Disease Brother     No Known Problems Mother        Social History     Social History    Marital status:      Spouse name: N/A    Number of children: N/A    Years of education: N/A     Occupational History    Not on file.      Social History Main Topics    Smoking status: Never Smoker    Smokeless tobacco: Never Used   [facility] Alcohol use No    Drug use: No    Sexual activity: No     Other Topics Concern    Not on file     Social History Narrative            Visit Vitals    BP 97/54 (BP 1 Location: Left arm, BP Patient Position: Sitting)    Pulse 78    Temp 98 °F (36.7 °C) (Oral)    Resp 16    Ht 5' 5.5\" (1.664 m)    Wt 147 lb (66.7 kg)    SpO2 94%    BMI 24.09 kg/m2       Physical Examination:   General - Well appearing female  HEENT - PERRL, TM no erythema/opacification, normal nasal turbinates, no oropharyngeal erythema or exudate, MMM  Neck - supple, no bruits, no thyroidomegaly, no lymphadenopathy  Pulm - clear to auscultation bilaterally  Cardio - RRR, normal S1 S2, no murmur  Abd - soft, nontender, no masses, no HSM  Extrem - no edema, +2 distal pulses  Neuro-  No focal deficits, CN intact     Assessment/Plan:    1.  htn--bit low today, continue to monitor weekly. If less than 100/, would stop the hctz and just continue with norvasc  2. Diarrhea--try viberzi. Has appt with gi, dr Weldon Schilder next week  3.  uti--send cx, start macrobid  4.   Dementia--continue aricept and namenda    rtc 3 months        Haley Leos III, DO

## 2017-04-14 NOTE — PROGRESS NOTES
Reviewed record in preparation for visit and have obtained necessary documentation. Identified pt with two pt identifiers(name and ). Chief Complaint   Patient presents with    Hypertension     follow up    Fatigue       Health Maintenance Due   Topic Date Due    GLAUCOMA SCREENING Q2Y  2002    MEDICARE YEARLY EXAM  2016    Pneumococcal 65+ Low/Medium Risk (2 of 2 - PCV13) 2017       Ms. Abel has a reminder for a \"due or due soon\" health maintenance. I have asked that she discuss health maintenance topic(s) due with Her  primary care provider. Coordination of Care Questionnaire:  :     1) Have you been to an emergency room, urgent care clinic since your last visit? no   Hospitalized since your last visit? no             2) Have you seen or consulted any other health care providers outside of [address] [place] [address] since your last visit? no  (Include any pap smears or colon screenings in this section.)      Patient is accompanied by self I have received verbal consent from [place] to discuss any/all medical information while they are present in the room.

## 2017-04-14 NOTE — PATIENT INSTRUCTIONS
Diarrhea: Care Instructions  Your Care Instructions    Diarrhea is loose, watery stools (bowel movements). The exact cause is often hard to find. Sometimes diarrhea is your body's way of getting rid of what caused an upset stomach. Viruses, food poisoning, and many medicines can cause diarrhea. Some people get diarrhea in response to emotional stress, anxiety, or certain foods. Almost everyone has diarrhea now and then. It usually isn't serious, and your stools will return to normal soon. The important thing to do is replace the fluids you have lost, so you can prevent dehydration. The doctor has checked you carefully, but problems can develop later. If you notice any problems or new symptoms, get medical treatment right away. Follow-up care is a key part of your treatment and safety. Be sure to make and go to all appointments, and call your doctor if you are having problems. It's also a good idea to know your test results and keep a list of the medicines you take. How can you care for yourself at home? · Watch for signs of dehydration, which means your body has lost too much water. Dehydration is a serious condition and should be treated right away. Signs of dehydration are:  ¨ Increasing thirst and dry eyes and mouth. ¨ Feeling faint or lightheaded. ¨ Darker urine, and a smaller amount of urine than normal.  · To prevent dehydration, drink plenty of fluids, enough so that your urine is light yellow or clear like water. Choose water and other caffeine-free clear liquids until you feel better. If you have kidney, heart, or liver disease and have to limit fluids, talk with your doctor before you increase the amount of fluids you drink. · Begin eating small amounts of mild foods the next day, if you feel like it. ¨ Try yogurt that has live cultures of Lactobacillus. (Check the label.)  ¨ Avoid spicy foods, fruits, alcohol, and caffeine until 48 hours after all symptoms are gone.   ¨ Avoid chewing gum that contains sorbitol. ¨ Avoid dairy products (except for yogurt with Lactobacillus) while you have diarrhea and for 3 days after symptoms are gone. · The doctor may recommend that you take over-the-counter medicine, such as loperamide (Imodium), if you still have diarrhea after 6 hours. Read and follow all instructions on the label. Do not use this medicine if you have bloody diarrhea, a high fever, or other signs of serious illness. Call your doctor if you think you are having a problem with your medicine. When should you call for help? Call 911 anytime you think you may need emergency care. For example, call if:  · You passed out (lost consciousness). · Your stools are maroon or very bloody. Call your doctor now or seek immediate medical care if:  · You are dizzy or lightheaded, or you feel like you may faint. · Your stools are black and look like tar, or they have streaks of blood. · You have new or worse belly pain. · You have symptoms of dehydration, such as:  ¨ Dry eyes and a dry mouth. ¨ Passing only a little dark urine. ¨ Feeling thirstier than usual.  · You have a new or higher fever. Watch closely for changes in your health, and be sure to contact your doctor if:  · Your diarrhea is getting worse. · You see pus in the diarrhea. · You are not getting better after 2 days (48 hours). Where can you learn more? Go to http://jodi-edin.info/. Enter A413 in the search box to learn more about \"Diarrhea: Care Instructions. \"  Current as of: May 27, 2016  Content Version: 11.2  © 5182-2553 [employer]. Care instructions adapted under license by [employer] (which disclaims liability or warranty for this information). If you have questions about a medical condition or this instruction, always ask your healthcare professional. [address] any warranty or liability for your use of this information.   If blood pressure is less than 100/60, then stop the hctz and continue only with the norvasc.

## 2017-04-14 NOTE — MR AVS SNAPSHOT
Visit Information Date & Time Provider Department Dept. Phone Encounter #  
 4/14/2017 10:15 AM Sheron Vera, [address] [facility] [address] [phone] [id number] Follow-up Instructions Return in about 3 months (around 7/14/2017). Upcoming Health Maintenance Date Due  
 GLAUCOMA SCREENING Q2Y 4/23/2002 MEDICARE YEARLY EXAM 11/30/2016 Pneumococcal 65+ Low/Medium Risk (2 of 2 - PCV13) 1/27/2017 DTaP/Tdap/Td series (2 - Td) 1/27/2026 Allergies as of 4/14/2017  Review Complete On: 4/14/2017 By: Óscar Higgins III, DO Severity Noted Reaction Type Reactions Ciprofloxacin  08/05/2011    Itching Ditropan [Oxybutynin Chloride]  08/16/2011    Itching Lisinopril  08/05/2011    Itching Sulfa (Sulfonamide Antibiotics)  08/05/2011    Itching Toprol Xl [Metoprolol Succinate]  08/05/2011    Itching Current Immunizations  Never Reviewed Name Date Pneumococcal Polysaccharide (PPSV-23) 1/27/2016 Tdap 1/27/2016 Not reviewed this visit You Were Diagnosed With   
  
 Codes Comments Urinary tract infection without hematuria, site unspecified    -  Primary ICD-10-CM: N39.0 ICD-9-CM: 599.0 Diarrhea, unspecified type     ICD-10-CM: R19.7 ICD-9-CM: 787.91 Essential hypertension     ICD-10-CM: I10 
ICD-9-CM: 401.9 Late onset Alzheimer's disease without behavioral disturbance     ICD-10-CM: G30.1, F02.80 ICD-9-CM: 331.0, 294.10 Depression, unspecified depression type     ICD-10-CM: F32.9 ICD-9-CM: 935 Vitals BP Pulse Temp Resp Height(growth percentile) Weight(growth percentile) 97/54 (BP 1 Location: Left arm, BP Patient Position: Sitting) 78 98 °F (36.7 °C) (Oral) 16 5' 5.5\" (1.664 m) 147 lb (66.7 kg) SpO2 BMI OB Status Smoking Status 94% 24.09 kg/m2 Postmenopausal Never Smoker Vitals History BMI and BSA Data Body Mass Index Body Surface Area  24.09 kg/m 2 1.76 m 2  
  
  
 Preferred Pharmacy Pharmacy Name Phone [facility] [facility], [address] [place] [address] Irina Bay Said [phone] Your Updated Medication List  
  
   
This list is accurate as of: 4/14/17 11:14 AM.  Always use your most recent med list. amLODIPine 5 mg tablet Commonly known as:  Delphina Peat TAKE 1 TABLET BY MOUTH DAILY FOR BLOOD PRESSURE. CALCIUM 500 PO Take  by mouth daily. donepezil 10 mg tablet Commonly known as:  ARICEPT  
TAKE 1 TABLET BY MOUTH EVERY NIGHT. eluxadoline 75 mg tablet Commonly known as:  VIBERZI Take 1 Tab by mouth two (2) times daily (with meals). Max Daily Amount: 150 mg.  
  
 escitalopram oxalate 10 mg tablet Commonly known as:  Aliene Apo Take 1 Tab by mouth daily. fluorometholone 0.1 % ophthalmic suspension Commonly known as: [facility] Administer 1 Drop to both eyes two (2) times a day. hydroCHLOROthiazide 25 mg tablet Commonly known as:  HYDRODIURIL  
TAKE 1 TABLET BY MOUTH DAILY FOR BLOOD PRESSURE.  
  
 ibuprofen 200 mg tablet Commonly known as:  MOTRIN Take  by mouth.  
  
 meloxicam 15 mg tablet Commonly known as:  MOBIC Take 15 mg by mouth daily. memantine 5 mg tablet Commonly known as:  Dinah Dawson TAKE 1 TABLET BY MOUTH DAILY. nitrofurantoin (macrocrystal-monohydrate) 100 mg capsule Commonly known as:  MACROBID Take 1 Cap by mouth two (2) times a day for 3 days. omega-3 fatty acids-vitamin e 1,000 mg Cap Take 1 Cap by mouth daily. potassium chloride SR 10 mEq tablet Commonly known as:  KLOR-CON 10  
TAKE 1 TABLET BY MOUTH DAILY FOR POTASSIUM. suvorexant 10 mg tablet Commonly known as:  Lafaye Lack Take 1 Tab by mouth nightly as needed for Insomnia. Max Daily Amount: 10 mg.  
  
 traMADol 50 mg tablet Commonly known as:  ULTRAM  
Take 1 Tab by mouth every six (6) hours as needed for Pain. Max Daily Amount: 200 mg.  Indications: PAIN  
  
 Pauline Garcia Commonly known as:  ULTRA-LIGHT ROLLATOR Dx: F03.90 M19.90 M25.551 Prescriptions Printed Refills  
 eluxadoline (VIBERZI) 75 mg tablet 6 Sig: Take 1 Tab by mouth two (2) times daily (with meals). Max Daily Amount: 150 mg.  
 Class: Print Route: Oral  
  
Prescriptions Sent to Pharmacy Refills  
 amLODIPine (NORVASC) 5 mg tablet 9 Sig: TAKE 1 TABLET BY MOUTH DAILY FOR BLOOD PRESSURE. Class: Normal  
 Pharmacy: [facility] Kory Alarcon Ph #: [phone]  
 hydroCHLOROthiazide (HYDRODIURIL) 25 mg tablet 9 Sig: TAKE 1 TABLET BY MOUTH DAILY FOR BLOOD PRESSURE. Class: Normal  
 Pharmacy: [facility] K[facility] Alarcon Ph #: [phone]  
 nitrofurantoin, macrocrystal-monohydrate, (MACROBID) 100 mg capsule 0 Sig: Take 1 Cap by mouth two (2) times a day for 3 days. Class: Normal  
 Pharmacy: [facility] [address] at [address] [place] [address] Ph #: [phone] Route: Oral  
  
We Performed the Following CULTURE, URINE [id number] CPT(R)] Follow-up Instructions Return in about 3 months (around 7/14/2017). Patient Instructions Diarrhea: Care Instructions Your Care Instructions Diarrhea is loose, watery stools (bowel movements). The exact cause is often hard to find. Sometimes diarrhea is your body's way of getting rid of what caused an upset stomach. Viruses, food poisoning, and many medicines can cause diarrhea. Some people get diarrhea in response to emotional stress, anxiety, or certain foods. Almost everyone has diarrhea now and then. It usually isn't serious, and your stools will return to normal soon. The important thing to do is replace the fluids you have lost, so you can prevent dehydration. The doctor has checked you carefully, but problems can develop later.  If you notice any problems or new symptoms, get medical treatment right away. Follow-up care is a key part of your treatment and safety. Be sure to make and go to all appointments, and call your doctor if you are having problems. It's also a good idea to know your test results and keep a list of the medicines you take. How can you care for yourself at home? · Watch for signs of dehydration, which means your body has lost too much water. Dehydration is a serious condition and should be treated right away. Signs of dehydration are: 
¨ Increasing thirst and dry eyes and mouth. ¨ Feeling faint or lightheaded. ¨ Darker urine, and a smaller amount of urine than normal. 
· To prevent dehydration, drink plenty of fluids, enough so that your urine is light yellow or clear like water. Choose water and other caffeine-free clear liquids until you feel better. If you have kidney, heart, or liver disease and have to limit fluids, talk with your doctor before you increase the amount of fluids you drink. · Begin eating small amounts of mild foods the next day, if you feel like it. ¨ Try yogurt that has live cultures of Lactobacillus. (Check the label.) ¨ Avoid spicy foods, fruits, alcohol, and caffeine until 48 hours after all symptoms are gone. ¨ Avoid chewing gum that contains sorbitol. ¨ Avoid dairy products (except for yogurt with Lactobacillus) while you have diarrhea and for 3 days after symptoms are gone. · The doctor may recommend that you take over-the-counter medicine, such as loperamide (Imodium), if you still have diarrhea after 6 hours. Read and follow all instructions on the label. Do not use this medicine if you have bloody diarrhea, a high fever, or other signs of serious illness. Call your doctor if you think you are having a problem with your medicine. When should you call for help? Call 911 anytime you think you may need emergency care. For example, call if: 
· You passed out (lost consciousness). · Your stools are maroon or very bloody. Call your doctor now or seek immediate medical care if: 
· You are dizzy or lightheaded, or you feel like you may faint. · Your stools are black and look like tar, or they have streaks of blood. · You have new or worse belly pain. · You have symptoms of dehydration, such as: ¨ Dry eyes and a dry mouth. ¨ Passing only a little dark urine. ¨ Feeling thirstier than usual. 
· You have a new or higher fever. Watch closely for changes in your health, and be sure to contact your doctor if: 
· Your diarrhea is getting worse. · You see pus in the diarrhea. · You are not getting better after 2 days (48 hours). Where can you learn more? Go to http://jodi-edin.info/. Enter Y050 in the search box to learn more about \"Diarrhea: Care Instructions. \" Current as of: May 27, 2016 Content Version: 11.2 © 5559-4622 [employer]. Care instructions adapted under license by [employer] (which disclaims liability or warranty for this information). If you have questions about a medical condition or this instruction, always ask your healthcare professional. [address] any warranty or liability for your use of this information. If blood pressure is less than 100/60, then stop the hctz and continue only with the norvasc. Introducing [facility]! King Eloina introduces sourceasy patient portal. Now you can access parts of your medical record, email your doctor's office, and request medication refills online. 1. In your internet browser, go to [email or web address] 2. Click on the First Time User? Click Here link in the Sign In box. You will see the New Member Sign Up page. 3. Enter your sourceasy Access Code exactly as it appears below. You will not need to use this code after youve completed the sign-up process. If you do not sign up before the expiration date, you must request a new code. · skyrockit Access Code: [email or web address] Expires: 7/13/2017 11:14 AM 
 
4. Enter the last four digits of your Social Security Number (xxxx) and Date of Birth (mm/dd/yyyy) as indicated and click Submit. You will be taken to the next sign-up page. 5. Create a skyrockit ID. This will be your skyrockit login ID and cannot be changed, so think of one that is secure and easy to remember. 6. Create a skyrockit password. You can change your password at any time. 7. Enter your Password Reset Question and Answer. This can be used at a later time if you forget your password. 8. Enter your e-mail address. You will receive e-mail notification when new information is available in [address] 9. Click Sign Up. You can now view and download portions of your medical record. 10. Click the Download Summary menu link to download a portable copy of your medical information. If you have questions, please visit the Frequently Asked Questions section of the skyrockit website. Remember, skyrockit is NOT to be used for urgent needs. For medical emergencies, dial 911. Now available from your iPhone and Android! Please provide this summary of care documentation to your next provider. Your primary care clinician is listed as Professor Sorto [address] If you have any questions after today's visit, please call [phone].

## 2017-04-16 LAB — BACTERIA UR CULT: ABNORMAL

## 2017-04-17 NOTE — PROGRESS NOTES
Urine cx grew klebsiella, probably 2nd most common bacteria to cause utis. Make sure to finish the macrobid.

## 2017-04-18 NOTE — PROGRESS NOTES
Lab results reviewed with patient's daughter, Cristine Nessa. Vemikely Nessa verbalized understanding. Talked with Cristine Nessa about ways to prevent UTIs.

## 2017-04-21 ENCOUNTER — TELEPHONE (OUTPATIENT)
Dept: INTERNAL MEDICINE CLINIC | Age: 82
End: 2017-04-21

## 2017-04-21 NOTE — TELEPHONE ENCOUNTER
Patient's daughter, Zarina Viera, states she needs a call back in reference to patient's persistent UTI symptoms & also Prior Auth on medication. Please call to discuss.  Thank you

## 2017-04-25 NOTE — TELEPHONE ENCOUNTER
Daughter is still waiting on PA to be done for the eluxadoline and pt is still showing signs of a UTI. Please call today. Thanks.

## 2017-04-26 RX ORDER — ESCITALOPRAM OXALATE 10 MG/1
TABLET ORAL
Qty: 30 TAB | Refills: 9 | Status: SHIPPED | OUTPATIENT
Start: 2017-04-26

## 2017-04-27 ENCOUNTER — TELEPHONE (OUTPATIENT)
Dept: INTERNAL MEDICINE CLINIC | Age: 82
End: 2017-04-27

## 2017-04-27 DIAGNOSIS — N39.0 URINARY TRACT INFECTION WITHOUT HEMATURIA, SITE UNSPECIFIED: Primary | ICD-10-CM

## 2017-04-27 NOTE — TELEPHONE ENCOUNTER
PA approved however, co pay is $522, as insurance is only paying for half the amount. Daughter has been advised of this & we will defer medication to GI. She also explained that since taking abx her mother is still experiencing fatigue & urinary frequency. Per Dr. Tammy Yun he would like them to drop off another urine sample for UA & UCX. Ms. Gale Martinez states she is going out of town next week & will not be able to be reached by phone for [facility] results. There is no one else to care for patient while she is gone.

## 2017-04-27 NOTE — TELEPHONE ENCOUNTER
Patient states she's returning your call in reference to getting there status of Prior Auth on medication. Roland Jones also needs to discuss persistent sign of patient still having UTI. Please call to discuss.  Thank you

## 2017-04-28 ENCOUNTER — CLINICAL SUPPORT (OUTPATIENT)
Dept: INTERNAL MEDICINE CLINIC | Age: 82
End: 2017-04-28

## 2017-04-28 ENCOUNTER — HOSPITAL ENCOUNTER (OUTPATIENT)
Dept: LAB | Age: 82
Discharge: HOME | End: 2017-04-28
Payer: MEDICARE

## 2017-04-28 ENCOUNTER — TELEPHONE (OUTPATIENT)
Dept: INTERNAL MEDICINE CLINIC | Age: 82
End: 2017-04-28

## 2017-04-28 DIAGNOSIS — N39.0 URINARY TRACT INFECTION WITHOUT HEMATURIA, SITE UNSPECIFIED: Primary | ICD-10-CM

## 2017-04-28 LAB
BILIRUB UR QL STRIP: NEGATIVE
GLUCOSE UR-MCNC: NEGATIVE MG/DL
KETONES P FAST UR STRIP-MCNC: NEGATIVE MG/DL
PH UR STRIP: 6 [PH] (ref 4.6–8)
PROT UR QL STRIP: NEGATIVE MG/DL
SP GR UR STRIP: 1.02 (ref 1–1.03)
UA UROBILINOGEN AMB POC: NORMAL (ref 0.2–1)
URINALYSIS CLARITY POC: NORMAL
URINALYSIS COLOR POC: YELLOW
URINE BLOOD POC: NEGATIVE
URINE LEUKOCYTES POC: NORMAL
URINE NITRITES POC: POSITIVE

## 2017-04-28 PROCEDURE — 87077 CULTURE AEROBIC IDENTIFY: CPT

## 2017-04-28 PROCEDURE — 87086 URINE CULTURE/COLONY COUNT: CPT

## 2017-04-28 PROCEDURE — 87088 URINE BACTERIA CULTURE: CPT

## 2017-04-28 PROCEDURE — 87186 SC STD MICRODIL/AGAR DIL: CPT

## 2017-04-28 RX ORDER — CIPROFLOXACIN 250 MG/1
250 TABLET, FILM COATED ORAL 2 TIMES DAILY
Qty: 10 TAB | Refills: 0 | Status: CANCELLED | OUTPATIENT
Start: 2017-04-28

## 2017-04-28 RX ORDER — TETRACYCLINE HYDROCHLORIDE 250 MG/1
250 CAPSULE ORAL
Qty: 20 CAP | Refills: 0 | Status: SHIPPED | OUTPATIENT
Start: 2017-04-28 | End: 2017-05-03

## 2017-04-28 NOTE — TELEPHONE ENCOUNTER
Ms. Shona Whyte was unable to leave a urine sample. She took the urine cup home to try and her daughter may bring it by this afternoon. Thanks.

## 2017-04-28 NOTE — TELEPHONE ENCOUNTER
Tetracycline not covered. Alternate is Doxycycline in which okay with Dr. Abhijeet Ulloa. Doxycycline 100mg #10 tablets with zero refills called into [facility].

## 2017-04-28 NOTE — TELEPHONE ENCOUNTER
Please call Zarina Arrant as soon as possible with results of culture as she has to leave town and needs to know what to do.

## 2017-04-28 NOTE — TELEPHONE ENCOUNTER
Reviewed results with daughter per Dr. Jace Grewal. I have reviewed the provider's instructions with the patient, answering all questions to her satisfaction.

## 2017-04-30 LAB — BACTERIA UR CULT: ABNORMAL

## 2017-05-01 NOTE — TELEPHONE ENCOUNTER
Urine cx grew same bacteria as before. Not sure why the macrobid did not do the trick. Make sure to take the doxycycline as prescribed.

## 2017-05-11 NOTE — PROGRESS NOTES
Lab results reviewed with patient's daughter, Wilfred Bailon. Genet Grande verbalized understanding and states that the patient completed the course antibiotics.

## 2017-05-11 NOTE — TELEPHONE ENCOUNTER
Lab results reviewed with patient's daughter, Jayleen Carl. Carmelita Clark verbalized understanding and states that the patient completed the course of doxycycline.

## 2017-05-18 ENCOUNTER — TELEPHONE (OUTPATIENT)
Dept: INTERNAL MEDICINE CLINIC | Age: 82
End: 2017-05-18

## 2017-05-18 NOTE — TELEPHONE ENCOUNTER
Last office visit notes and labs faxed to Dr. Uday Ferguson office per patients request. Confirmation received

## 2017-05-18 NOTE — TELEPHONE ENCOUNTER
Helen-daughter on [employer] states that she is needing the pt's notes to be faxed to Dr. Chandler De Oliveira at #[phone]. Zarina Viera states that the pt has an appt with him on 5/26/17. Please call Zarina Viera if needed.

## 2017-05-27 ENCOUNTER — APPOINTMENT (OUTPATIENT)
Dept: GENERAL RADIOLOGY | Age: 82
DRG: 853 | End: 2017-05-27
Attending: EMERGENCY MEDICINE
Payer: MEDICARE

## 2017-05-27 ENCOUNTER — APPOINTMENT (OUTPATIENT)
Dept: ULTRASOUND IMAGING | Age: 82
DRG: 853 | End: 2017-05-27
Attending: INTERNAL MEDICINE
Payer: MEDICARE

## 2017-05-27 ENCOUNTER — HOSPITAL ENCOUNTER (INPATIENT)
Age: 82
LOS: 7 days | Discharge: NURSING, REHAB OR LONG-TERM CARE | DRG: 853 | End: 2017-06-03
Attending: EMERGENCY MEDICINE | Admitting: INTERNAL MEDICINE
Payer: MEDICARE

## 2017-05-27 ENCOUNTER — APPOINTMENT (OUTPATIENT)
Dept: CT IMAGING | Age: 82
DRG: 853 | End: 2017-05-27
Attending: EMERGENCY MEDICINE
Payer: MEDICARE

## 2017-05-27 DIAGNOSIS — S32.000A COMPRESSION FRACTURE OF LUMBAR VERTEBRA, CLOSED, INITIAL ENCOUNTER (HCC): ICD-10-CM

## 2017-05-27 DIAGNOSIS — N17.9 AKI (ACUTE KIDNEY INJURY) (HCC): Primary | ICD-10-CM

## 2017-05-27 DIAGNOSIS — R77.8 ELEVATED TROPONIN: ICD-10-CM

## 2017-05-27 DIAGNOSIS — R41.0 CONFUSION: ICD-10-CM

## 2017-05-27 PROBLEM — N39.0 UTI (URINARY TRACT INFECTION): Status: ACTIVE | Noted: 2017-05-27

## 2017-05-27 LAB
ALBUMIN SERPL BCP-MCNC: 2.4 G/DL (ref 3.5–5)
ALBUMIN/GLOB SERPL: 0.6 {RATIO} (ref 1.1–2.2)
ALP SERPL-CCNC: 163 U/L (ref 45–117)
ALT SERPL-CCNC: 31 U/L (ref 12–78)
ANION GAP BLD CALC-SCNC: 10 MMOL/L (ref 5–15)
APPEARANCE UR: CLEAR
AST SERPL W P-5'-P-CCNC: 42 U/L (ref 15–37)
BACTERIA URNS QL MICRO: ABNORMAL /HPF
BASOPHILS # BLD AUTO: 0.2 K/UL (ref 0–0.1)
BASOPHILS # BLD: 1 % (ref 0–1)
BILIRUB SERPL-MCNC: 0.7 MG/DL (ref 0.2–1)
BILIRUB UR QL: NEGATIVE
BUN SERPL-MCNC: 68 MG/DL (ref 6–20)
BUN/CREAT SERPL: 35 (ref 12–20)
CALCIUM SERPL-MCNC: 8.6 MG/DL (ref 8.5–10.1)
CHLORIDE SERPL-SCNC: 103 MMOL/L (ref 97–108)
CK SERPL-CCNC: 39 U/L (ref 26–192)
CO2 SERPL-SCNC: 25 MMOL/L (ref 21–32)
COLOR UR: ABNORMAL
CREAT SERPL-MCNC: 1.96 MG/DL (ref 0.55–1.02)
DIFFERENTIAL METHOD BLD: ABNORMAL
EOSINOPHIL # BLD: 0.2 K/UL (ref 0–0.4)
EOSINOPHIL NFR BLD: 1 % (ref 0–7)
EPITH CASTS URNS QL MICRO: ABNORMAL /LPF
ERYTHROCYTE [DISTWIDTH] IN BLOOD BY AUTOMATED COUNT: 14.9 % (ref 11.5–14.5)
GLOBULIN SER CALC-MCNC: 4.3 G/DL (ref 2–4)
GLUCOSE SERPL-MCNC: 93 MG/DL (ref 65–100)
GLUCOSE UR STRIP.AUTO-MCNC: NEGATIVE MG/DL
HCT VFR BLD AUTO: 44.1 % (ref 35–47)
HGB BLD-MCNC: 15.5 G/DL (ref 11.5–16)
HGB UR QL STRIP: ABNORMAL
KETONES UR QL STRIP.AUTO: NEGATIVE MG/DL
LEUKOCYTE ESTERASE UR QL STRIP.AUTO: ABNORMAL
LYMPHOCYTES # BLD AUTO: 2 % (ref 12–49)
LYMPHOCYTES # BLD: 0.5 K/UL (ref 0.8–3.5)
MCH RBC QN AUTO: 29.9 PG (ref 26–34)
MCHC RBC AUTO-ENTMCNC: 35.1 G/DL (ref 30–36.5)
MCV RBC AUTO: 85 FL (ref 80–99)
MONOCYTES # BLD: 1.7 K/UL (ref 0–1)
MONOCYTES NFR BLD AUTO: 7 % (ref 5–13)
NEUTS BAND NFR BLD MANUAL: 5 % (ref 0–6)
NEUTS SEG # BLD: 21.7 K/UL (ref 1.8–8)
NEUTS SEG NFR BLD AUTO: 84 % (ref 32–75)
NITRITE UR QL STRIP.AUTO: POSITIVE
PH UR STRIP: 6 [PH] (ref 5–8)
PLATELET # BLD AUTO: 150 K/UL (ref 150–400)
POTASSIUM SERPL-SCNC: 3.2 MMOL/L (ref 3.5–5.1)
PROT SERPL-MCNC: 6.7 G/DL (ref 6.4–8.2)
PROT UR STRIP-MCNC: NEGATIVE MG/DL
RBC # BLD AUTO: 5.19 M/UL (ref 3.8–5.2)
RBC #/AREA URNS HPF: ABNORMAL /HPF (ref 0–5)
RBC MORPH BLD: ABNORMAL
SODIUM SERPL-SCNC: 138 MMOL/L (ref 136–145)
SP GR UR REFRACTOMETRY: 1.01 (ref 1–1.03)
TROPONIN I SERPL-MCNC: 0.14 NG/ML
TROPONIN I SERPL-MCNC: 0.15 NG/ML
UA: UC IF INDICATED,UAUC: ABNORMAL
UROBILINOGEN UR QL STRIP.AUTO: 1 EU/DL (ref 0.2–1)
WBC # BLD AUTO: 24.3 K/UL (ref 3.6–11)
WBC MORPH BLD: ABNORMAL
WBC URNS QL MICRO: ABNORMAL /HPF (ref 0–4)

## 2017-05-27 PROCEDURE — 74011250636 HC RX REV CODE- 250/636: Performed by: INTERNAL MEDICINE

## 2017-05-27 PROCEDURE — 84484 ASSAY OF TROPONIN QUANT: CPT | Performed by: EMERGENCY MEDICINE

## 2017-05-27 PROCEDURE — 82550 ASSAY OF CK (CPK): CPT | Performed by: EMERGENCY MEDICINE

## 2017-05-27 PROCEDURE — 99284 EMERGENCY DEPT VISIT MOD MDM: CPT

## 2017-05-27 PROCEDURE — 82570 ASSAY OF URINE CREATININE: CPT | Performed by: INTERNAL MEDICINE

## 2017-05-27 PROCEDURE — 72100 X-RAY EXAM L-S SPINE 2/3 VWS: CPT

## 2017-05-27 PROCEDURE — 81001 URINALYSIS AUTO W/SCOPE: CPT | Performed by: EMERGENCY MEDICINE

## 2017-05-27 PROCEDURE — 85025 COMPLETE CBC W/AUTO DIFF WBC: CPT | Performed by: EMERGENCY MEDICINE

## 2017-05-27 PROCEDURE — 87077 CULTURE AEROBIC IDENTIFY: CPT | Performed by: INTERNAL MEDICINE

## 2017-05-27 PROCEDURE — 80053 COMPREHEN METABOLIC PANEL: CPT | Performed by: EMERGENCY MEDICINE

## 2017-05-27 PROCEDURE — 74011000258 HC RX REV CODE- 258: Performed by: INTERNAL MEDICINE

## 2017-05-27 PROCEDURE — 93005 ELECTROCARDIOGRAM TRACING: CPT

## 2017-05-27 PROCEDURE — 87186 SC STD MICRODIL/AGAR DIL: CPT | Performed by: INTERNAL MEDICINE

## 2017-05-27 PROCEDURE — 70450 CT HEAD/BRAIN W/O DYE: CPT

## 2017-05-27 PROCEDURE — 73502 X-RAY EXAM HIP UNI 2-3 VIEWS: CPT

## 2017-05-27 PROCEDURE — 76770 US EXAM ABDO BACK WALL COMP: CPT

## 2017-05-27 PROCEDURE — 36415 COLL VENOUS BLD VENIPUNCTURE: CPT | Performed by: EMERGENCY MEDICINE

## 2017-05-27 PROCEDURE — 87040 BLOOD CULTURE FOR BACTERIA: CPT | Performed by: INTERNAL MEDICINE

## 2017-05-27 PROCEDURE — 65660000000 HC RM CCU STEPDOWN

## 2017-05-27 PROCEDURE — 71010 XR CHEST PORT: CPT

## 2017-05-27 PROCEDURE — 74011250637 HC RX REV CODE- 250/637: Performed by: INTERNAL MEDICINE

## 2017-05-27 PROCEDURE — 84300 ASSAY OF URINE SODIUM: CPT | Performed by: INTERNAL MEDICINE

## 2017-05-27 PROCEDURE — 87086 URINE CULTURE/COLONY COUNT: CPT | Performed by: EMERGENCY MEDICINE

## 2017-05-27 PROCEDURE — 74011250636 HC RX REV CODE- 250/636: Performed by: EMERGENCY MEDICINE

## 2017-05-27 RX ORDER — MEMANTINE HYDROCHLORIDE 10 MG/1
5 TABLET ORAL DAILY
Status: DISCONTINUED | OUTPATIENT
Start: 2017-05-28 | End: 2017-06-03 | Stop reason: HOSPADM

## 2017-05-27 RX ORDER — ACETAMINOPHEN 325 MG/1
650 TABLET ORAL
Status: DISCONTINUED | OUTPATIENT
Start: 2017-05-27 | End: 2017-06-03 | Stop reason: HOSPADM

## 2017-05-27 RX ORDER — AMLODIPINE BESYLATE 5 MG/1
5 TABLET ORAL DAILY
Status: DISCONTINUED | OUTPATIENT
Start: 2017-05-28 | End: 2017-06-03 | Stop reason: HOSPADM

## 2017-05-27 RX ORDER — SODIUM CHLORIDE 0.9 % (FLUSH) 0.9 %
5-10 SYRINGE (ML) INJECTION AS NEEDED
Status: DISCONTINUED | OUTPATIENT
Start: 2017-05-27 | End: 2017-06-03 | Stop reason: HOSPADM

## 2017-05-27 RX ORDER — ESCITALOPRAM OXALATE 10 MG/1
10 TABLET ORAL DAILY
Status: DISCONTINUED | OUTPATIENT
Start: 2017-05-28 | End: 2017-06-03 | Stop reason: HOSPADM

## 2017-05-27 RX ORDER — SODIUM CHLORIDE 0.9 % (FLUSH) 0.9 %
5-10 SYRINGE (ML) INJECTION EVERY 8 HOURS
Status: DISCONTINUED | OUTPATIENT
Start: 2017-05-27 | End: 2017-06-03 | Stop reason: HOSPADM

## 2017-05-27 RX ORDER — GUAIFENESIN 100 MG/5ML
162 LIQUID (ML) ORAL
Status: COMPLETED | OUTPATIENT
Start: 2017-05-27 | End: 2017-05-27

## 2017-05-27 RX ORDER — NALOXONE HYDROCHLORIDE 0.4 MG/ML
0.4 INJECTION, SOLUTION INTRAMUSCULAR; INTRAVENOUS; SUBCUTANEOUS AS NEEDED
Status: DISCONTINUED | OUTPATIENT
Start: 2017-05-27 | End: 2017-06-03 | Stop reason: HOSPADM

## 2017-05-27 RX ORDER — SODIUM CHLORIDE 9 MG/ML
75 INJECTION, SOLUTION INTRAVENOUS CONTINUOUS
Status: DISCONTINUED | OUTPATIENT
Start: 2017-05-27 | End: 2017-05-31

## 2017-05-27 RX ORDER — ENOXAPARIN SODIUM 100 MG/ML
40 INJECTION SUBCUTANEOUS EVERY 24 HOURS
Status: DISCONTINUED | OUTPATIENT
Start: 2017-05-27 | End: 2017-05-28

## 2017-05-27 RX ORDER — DONEPEZIL HYDROCHLORIDE 5 MG/1
10 TABLET, FILM COATED ORAL
Status: DISCONTINUED | OUTPATIENT
Start: 2017-05-27 | End: 2017-06-03 | Stop reason: HOSPADM

## 2017-05-27 RX ORDER — HYDROCODONE BITARTRATE AND ACETAMINOPHEN 5; 325 MG/1; MG/1
1 TABLET ORAL
Status: DISCONTINUED | OUTPATIENT
Start: 2017-05-27 | End: 2017-06-01

## 2017-05-27 RX ADMIN — ASPIRIN 81 MG CHEWABLE TABLET 162 MG: 81 TABLET CHEWABLE at 18:08

## 2017-05-27 RX ADMIN — ENOXAPARIN SODIUM 40 MG: 40 INJECTION SUBCUTANEOUS at 20:15

## 2017-05-27 RX ADMIN — Medication 10 ML: at 20:15

## 2017-05-27 RX ADMIN — CEFTRIAXONE 1 G: 1 INJECTION, POWDER, FOR SOLUTION INTRAMUSCULAR; INTRAVENOUS at 22:22

## 2017-05-27 RX ADMIN — DONEPEZIL HYDROCHLORIDE 10 MG: 5 TABLET, FILM COATED ORAL at 21:10

## 2017-05-27 RX ADMIN — SODIUM CHLORIDE 75 ML/HR: 900 INJECTION, SOLUTION INTRAVENOUS at 19:06

## 2017-05-27 RX ADMIN — HYDROCODONE BITARTRATE AND ACETAMINOPHEN 1 TABLET: 5; 325 TABLET ORAL at 20:22

## 2017-05-27 NOTE — ED NOTES
Pt's family reports pt fell on Tuesday and cont's to have pain to her lower back. Pt was found today to be more confused, pt has chronic UTI's and is unable to clean self well so she is always positive per family.

## 2017-05-27 NOTE — PROGRESS NOTES
TRANSFER - IN REPORT:    Verbal report received from Lukasz Muro (name) on Grey Johnson  being received from ED (unit) for routine progression of care      Report consisted of patients Situation, Background, Assessment and   Recommendations(SBAR). Information from the following report(s) SBAR, Kardex, Intake/Output, MAR and Recent Results was reviewed with the receiving nurse. Opportunity for questions and clarification was provided. Assessment completed upon patients arrival to unit and care assumed.

## 2017-05-27 NOTE — PROGRESS NOTES
Patient transferred up at change of shift. Verbal report was given to Pauline Diaz RN about patient.

## 2017-05-27 NOTE — IP AVS SNAPSHOT
[address] [facility] 
[phone] Patient: Constanza Castillo 
MRN: [id number] VDI:7/17/7432 You are allergic to the following Allergen Reactions Ciprofloxacin Itching Ditropan (Oxybutynin Chloride) Itching Lisinopril Itching Sulfa (Sulfonamide Antibiotics) Itching Toprol Xl (Metoprolol Succinate) Itching Recent Documentation Height Weight BMI OB Status Smoking Status 1.626 m 65.8 kg 23.76 kg/m2 Postmenopausal Never Smoker Unresulted Labs Order Current Status HEPATITIS PANEL, ACUTE In process CULTURE, BLOOD Preliminary result CULTURE, BLOOD Preliminary result Emergency Contacts Name Discharge Info Relation Home Work Mobile [address] CAREGIVER [3] Daughter [21] [phone] Abby Wood  Child [2] [phone] Anselmo Villavicencio [2] [phone] About your hospitalization You were admitted on:  May 27, 2017 You last received care in the:  [facility] GENERAL SURGERY You were discharged on:  Gloria 3, 2017 Unit phone number:  [phone] Why you were hospitalized Your primary diagnosis was:  Sepsis, Gram Negative (Hcc) Your diagnoses also included:  Uti (Urinary Tract Infection), Depression, Mixed Incontinence Urge And Stress, Htn (Hypertension), Dementia, Altered Mental Status, Acute Renal Injury (Hcc), Fall At Home, Elevated Troponin, Closed Compression Fracture Of Lumbosacral Spine (Hcc) Providers Seen During Your Hospitalizations Provider Role Specialty Primary office phone Susana Escoto MD Attending Provider Emergency Medicine [phone] Derek Glynn MD Attending Provider Internal Medicine [phone] Krystyna Hughes MD Attending Provider Internal Medicine [phone] Your Primary Care Physician (PCP) Primary Care Physician Office Phone Office Fax Octavio STOCK [phone] Follow-up Information Follow up With Details Comments Contact Info [facility]  This is your post acute care provider of choice  D[facility] Aldana [address] [facility] 
[phone] Jaelyn Casey DO In 1 week  [address] [facility] 
[phone] Ronna Sol MD In 1 week  [address] [facility] 
[phone] Current Discharge Medication List  
  
START taking these medications Dose & Instructions Dispensing Information Comments Morning Noon Evening Bedtime  
 aspirin delayed-release 81 mg tablet Your last dose was: Your next dose is:    
   
   
 Dose:  81 mg Take 1 Tab by mouth daily. Quantity:  30 Tab Refills:  1  
     
   
   
   
  
 cephALEXin 500 mg capsule Commonly known as:  Donnamae Ron Your last dose was: Your next dose is:    
   
   
 Dose:  500 mg Take 1 Cap by mouth two (2) times a day for 3 days. Quantity:  6 Cap Refills:  0  
     
   
   
   
  
 L. acidoph & paracasei- S therm- Bifido 8 billion cell Cap cap Commonly known as:  ISAAK-Q/RISAQUAD Your last dose was: Your next dose is:    
   
   
 Dose:  1 Cap Take 1 Cap by mouth daily for 15 days. Quantity:  15 Cap Refills:  0  
     
   
   
   
  
 oxyCODONE-acetaminophen 5-325 mg per tablet Commonly known as:  PERCOCET Your last dose was: Your next dose is:    
   
   
 Dose:  1-2 Tab Take 1-2 Tabs by mouth every three (3) hours as needed. Max Daily Amount: 16 Tabs. Quantity:  20 Tab Refills:  0 CONTINUE these medications which have NOT CHANGED Dose & Instructions Dispensing Information Comments Morning Noon Evening Bedtime  
 amLODIPine 5 mg tablet Commonly known as:  Barabara Puls Your last dose was: Your next dose is: TAKE 1 TABLET BY MOUTH DAILY FOR BLOOD PRESSURE. Quantity:  30 Tab Refills:  9 CALCIUM 500 PO Your last dose was: Your next dose is: Take  by mouth daily. Refills:  0  
     
   
   
   
  
 donepezil 10 mg tablet Commonly known as:  ARICEPT Your last dose was: Your next dose is: TAKE 1 TABLET BY MOUTH EVERY NIGHT. Quantity:  30 Tab Refills:  6  
     
   
   
   
  
 eluxadoline 75 mg tablet Commonly known as:  VIBERZI Your last dose was: Your next dose is:    
   
   
 Dose:  75 mg Take 1 Tab by mouth two (2) times daily (with meals). Max Daily Amount: 150 mg.  
 Quantity:  60 Tab Refills:  6  
     
   
   
   
  
 escitalopram oxalate 10 mg tablet Commonly known as:  Adalgisa Steen Your last dose was: Your next dose is: TAKE 1 TABLET BY MOUTH DAILY. Quantity:  30 Tab Refills:  9  
     
   
   
   
  
 fluorometholone 0.1 % ophthalmic suspension Commonly known as:  FML Your last dose was: Your next dose is:    
   
   
 Dose:  1 Drop Administer 1 Drop to both eyes two (2) times a day. Refills:  0  
     
   
   
   
  
 memantine 5 mg tablet Commonly known as:  Alexsander Zhao Your last dose was: Your next dose is: TAKE 1 TABLET BY MOUTH DAILY. Quantity:  30 Tab Refills:  9  
     
   
   
   
  
 omega-3 fatty acids-vitamin e 1,000 mg Cap Your last dose was: Your next dose is:    
   
   
 Dose:  1 Cap Take 1 Cap by mouth daily. Refills:  0 Frankie Mussel Commonly known as:  ULTRA-LIGHT ROLLATOR Your last dose was: Your next dose is: Dx: F03.90 M19.90 M25.551 Quantity:  1 Each Refills:  0 STOP taking these medications ibuprofen 200 mg tablet Commonly known as:  MOTRIN  
   
  
 meloxicam 15 mg tablet Commonly known as:  MOBIC  
   
  
 traMADol 50 mg tablet Commonly known as:  Rosina Flank your doctor about these medications Dose & Instructions Dispensing Information Comments Morning Noon Evening Bedtime  
 hydroCHLOROthiazide 25 mg tablet Commonly known as:  HYDRODIURIL Your last dose was: Your next dose is: TAKE 1 TABLET BY MOUTH DAILY FOR BLOOD PRESSURE. Quantity:  30 Tab Refills:  9  
     
   
   
   
  
 potassium chloride SR 10 mEq tablet Commonly known as:  KLOR-CON 10 Your last dose was: Your next dose is: TAKE 1 TABLET BY MOUTH DAILY FOR POTASSIUM. Quantity:  30 Tab Refills:  6 Where to Get Your Medications These medications were sent to [address], [place] [address]  [address] [facility] [place], [address] [address] Phone:  [phone]  
  memantine 5 mg tablet Information on where to get these meds will be given to you by the nurse or doctor. ! Ask your nurse or doctor about these medications  
  aspirin delayed-release 81 mg tablet  
 cephALEXin 500 mg capsule L. acidoph & paracasei- S therm- Bifido 8 billion cell Cap cap  
 oxyCODONE-acetaminophen 5-325 mg per tablet Discharge Instructions None Discharge Orders None ACO Transitions of Care Introducing [employer] offers a voluntary care coordination program to provide high quality service and care to [facility] fee-for-service beneficiaries. Isrrael Novak was designed to help you enhance your health and well-being through the following services: ? Transitions of Care  support for individuals who are transitioning from one care setting to another (example: Hospital to home). ? Chronic and Complex Care Coordination  support for individuals and caregivers of those with serious or chronic illnesses or with more than one chronic (ongoing) condition and those who take a number of different medications. If you meet specific medical criteria, a [facility] [address] may call you directly to coordinate your care with your primary care physician and your other care providers. For questions about the [facility] programs, please, contact your physicians office. For general questions or additional information about Accountable Care Organizations: 
Please visit www.medicare.gov/acos. html or call 1-800-MEDICARE (7-429.908.7486) TTY users should call 3-428.237.4986. [employer] Announcement We are excited to announce that we are making your provider's discharge notes available to you in [employer]. You will see these notes when they are completed and signed by the physician that discharged you from your recent hospital stay. If you have any questions or concerns about any information you see in Nell[employer], please call the Health Information Department where you were seen or reach out to your Primary Care Provider for more information about your plan of care. Introducing [facility]! Adryan Verma introduces [employer] patient portal. Now you can access parts of your medical record, email your doctor's office, and request medication refills online. 1. In your internet browser, go to [email or web address] 2. Click on the First Time User? Click Here link in the Sign In box. You will see the New Member Sign Up page. 3. Enter your [employer] Access Code exactly as it appears below. You will not need to use this code after youve completed the sign-up process. If you do not sign up before the expiration date, you must request a new code. · [employer] Access Code: [email or web address] Expires: 7/13/2017 11:14 AM 
 
 4. Enter the last four digits of your Social Security Number (xxxx) and Date of Birth (mm/dd/yyyy) as indicated and click Submit. You will be taken to the next sign-up page. 5. Create a [facility] ID. This will be your [facility] login ID and cannot be changed, so think of one that is secure and easy to remember. 6. Create a [facility] password. You can change your password at any time. 7. Enter your Password Reset Question and Answer. This can be used at a later time if you forget your password. 8. Enter your e-mail address. You will receive e-mail notification when new information is available in [address] 9. Click Sign Up. You can now view and download portions of your medical record. 10. Click the Download Summary menu link to download a portable copy of your medical information. If you have questions, please visit the Frequently Asked Questions section of the [facility] website. Remember, [facility] is NOT to be used for urgent needs. For medical emergencies, dial 911. Now available from your iPhone and Android! General Information Please provide this summary of care documentation to your next provider. Patient Signature:  ____________________________________________________________ Date:  ____________________________________________________________  
  
Neva Lopes Provider Signature:  ____________________________________________________________ Date:  ____________________________________________________________

## 2017-05-27 NOTE — IP AVS SNAPSHOT
Current Discharge Medication List  
  
START taking these medications Dose & Instructions Dispensing Information Comments Morning Noon Evening Bedtime  
 aspirin delayed-release 81 mg tablet Your last dose was: Your next dose is:    
   
   
 Dose:  81 mg Take 1 Tab by mouth daily. Quantity:  30 Tab Refills:  1  
     
   
   
   
  
 cephALEXin 500 mg capsule Commonly known as:  Scarlett Jackson Your last dose was: Your next dose is:    
   
   
 Dose:  500 mg Take 1 Cap by mouth two (2) times a day for 3 days. Quantity:  6 Cap Refills:  0  
     
   
   
   
  
 L. acidoph & paracasei- S therm- Bifido 8 billion cell Cap cap Commonly known as:  ISAAK-Q/RISAQUAD Your last dose was: Your next dose is:    
   
   
 Dose:  1 Cap Take 1 Cap by mouth daily for 15 days. Quantity:  15 Cap Refills:  0  
     
   
   
   
  
 oxyCODONE-acetaminophen 5-325 mg per tablet Commonly known as:  PERCOCET Your last dose was: Your next dose is:    
   
   
 Dose:  1-2 Tab Take 1-2 Tabs by mouth every three (3) hours as needed. Max Daily Amount: 16 Tabs. Quantity:  20 Tab Refills:  0 CONTINUE these medications which have NOT CHANGED Dose & Instructions Dispensing Information Comments Morning Noon Evening Bedtime  
 amLODIPine 5 mg tablet Commonly known as:  Efra Woody Your last dose was: Your next dose is: TAKE 1 TABLET BY MOUTH DAILY FOR BLOOD PRESSURE. Quantity:  30 Tab Refills:  9 CALCIUM 500 PO Your last dose was: Your next dose is: Take  by mouth daily. Refills:  0  
     
   
   
   
  
 donepezil 10 mg tablet Commonly known as:  ARICEPT Your last dose was: Your next dose is: TAKE 1 TABLET BY MOUTH EVERY NIGHT. Quantity:  30 Tab Refills:  6 eluxadoline 75 mg tablet Commonly known as:  VIBERZI Your last dose was: Your next dose is:    
   
   
 Dose:  75 mg Take 1 Tab by mouth two (2) times daily (with meals). Max Daily Amount: 150 mg.  
 Quantity:  60 Tab Refills:  6  
     
   
   
   
  
 escitalopram oxalate 10 mg tablet Commonly known as:  Hilario Thomas Your last dose was: Your next dose is: TAKE 1 TABLET BY MOUTH DAILY. Quantity:  30 Tab Refills:  9  
     
   
   
   
  
 fluorometholone 0.1 % ophthalmic suspension Commonly known as:  FML Your last dose was: Your next dose is:    
   
   
 Dose:  1 Drop Administer 1 Drop to both eyes two (2) times a day. Refills:  0  
     
   
   
   
  
 memantine 5 mg tablet Commonly known as:  Tiffany Weston Your last dose was: Your next dose is: TAKE 1 TABLET BY MOUTH DAILY. Quantity:  30 Tab Refills:  9  
     
   
   
   
  
 omega-3 fatty acids-vitamin e 1,000 mg Cap Your last dose was: Your next dose is:    
   
   
 Dose:  1 Cap Take 1 Cap by mouth daily. Refills:  0 Rowillard Rani Commonly known as:  ULTRA-LIGHT ROLLATOR Your last dose was: Your next dose is: Dx: F03.90 M19.90 M25.551 Quantity:  1 Each Refills:  0 STOP taking these medications   
 ibuprofen 200 mg tablet Commonly known as:  MOTRIN  
   
  
 meloxicam 15 mg tablet Commonly known as:  MOBIC  
   
  
 traMADol 50 mg tablet Commonly known as:  Ilana Lucero your doctor about these medications Dose & Instructions Dispensing Information Comments Morning Noon Evening Bedtime  
 hydroCHLOROthiazide 25 mg tablet Commonly known as:  HYDRODIURIL Your last dose was: Your next dose is: TAKE 1 TABLET BY MOUTH DAILY FOR BLOOD PRESSURE. Quantity:  30 Tab Refills:  9 potassium chloride SR 10 mEq tablet Commonly known as:  KLOR-CON 10 Your last dose was: Your next dose is: TAKE 1 TABLET BY MOUTH DAILY FOR POTASSIUM. Quantity:  30 Tab Refills:  6 Where to Get Your Medications These medications were sent to [facility] [address] [address]  [address] [facility], [address] [address] Phone:  [phone]  
  memantine 5 mg tablet Information on where to get these meds will be given to you by the nurse or doctor. ! Ask your nurse or doctor about these medications  
  aspirin delayed-release 81 mg tablet  
 cephALEXin 500 mg capsule L. acidoph & paracasei- S therm- Bifido 8 billion cell Cap cap  
 oxyCODONE-acetaminophen 5-325 mg per tablet

## 2017-05-27 NOTE — ED NOTES
TRANSFER - OUT REPORT:    Verbal report given to [employer]) on [employer]  being transferred to Neuro(unit) for routine progression of care       Report consisted of patients Situation, Background, Assessment and   Recommendations(SBAR). Information from the following report(s) SBAR, Kardex, ED Summary, MAR, Accordion, Recent Results, Med Rec Status, Cardiac Rhythm a-fib and Alarm Parameters  was reviewed with the receiving nurse. Lines:   Peripheral IV 05/27/17 Left Antecubital (Active)   Site Assessment Clean, dry, & intact 5/27/2017  2:50 PM   Phlebitis Assessment 0 5/27/2017  2:50 PM   Infiltration Assessment 0 5/27/2017  2:50 PM   Dressing Status Clean, dry, & intact 5/27/2017  2:50 PM   Dressing Type Transparent 5/27/2017  2:50 PM   Hub Color/Line Status Pink 5/27/2017  2:50 PM        Opportunity for questions and clarification was provided.       Patient transported with:   Monitor

## 2017-05-27 NOTE — H&P
Hospitalist Admission Note    NAME: Marshall Meek   :  1935   MRN:  [id number]     Date/Time:  2017 4:56 PM    Patient PCP: Wil Aguilera III, DO  ________________________________________________________________________    My assessment of this patient's clinical condition and my plan of care is as follows. Assessment / Plan:  Acute renal failure  Confusion  -start IVF and get a renal sono  -also need a CPK and will order renal lytes  -apparently she has been eating and drinking; hold Hctz -strangely her BP is running low  -confusion may be due to UTI  Leukocytosis  -awaiting urinalysis, cxr neg; if + urinalysis then start ceftriaxone  Elevated troponin  HTN  -cycle troponins and give ASA now  -cont norvasc with parameters  Dementia  S/p fall  -cont aricept and namenda; she has been on mobic and tramadol for pain  L5 compression fracture of indeterminate age  -check an MRI    Code Status: DNR  DVT Prophylaxis: lovenox     Baseline: At [employer] in independent living -per daughter needs assisted living        Subjective:   CHIEF COMPLAINT:  Gradually increasing confusion for 2 days  HISTORY OF PRESENT ILLNESS:     Carmen Robb is a [de-identified] y.o.    female who presents with the above complaint and the below Pmhx. The patient's daughter notes that she checks in on the pt on a daily basis. She reports that the pt fell on 17 and started c/o the left lower back pain. She states that no one witnessed the fall, and both the daughter and the pt are unsure of the time that the pt was down for. She notes that she has been giving the pt Tramadol and Meloxicam for her pain, and using a walker since the fall. Per the daughter the patient has been talking less since the confusion began. \"She just really is not connecting. \" At baseline the patient knows the place but not the time. No cough, she was last on abx on  of a uti. No new GI symptoms.  She does not remember the fall but does not complain of any headaches. She has been complaining of L sided lower back pain since the fall. We were asked to admit for work up and evaluation of the above problems. Past Medical History:   Diagnosis Date    Arthritis     hands    Hypertension     Incontinence of urine     Other ill-defined conditions     freq UTIs    Thromboembolus ([address] 5-2011    left leg    Unspecified adverse effect of anesthesia     PONV    dementia    Past Surgical History:   Procedure Laterality Date    BREAST SURGERY PROCEDURE UNLISTED      right breast bx x2    HX CATARACT REMOVAL  2008    zoe    HX CHOLECYSTECTOMY      HX UROLOGICAL  2006    bladder sling    VASCULAR SURGERY PROCEDURE UNLIST  6-2011    left leg vein procedure DVT       Social History   Substance Use Topics    Smoking status: Never Smoker    Smokeless tobacco: Never Used    Alcohol use No        Family History   Problem Relation Age of Onset    Cancer Father      lung, prostate    Heart Disease Brother     No Known Problems Mother       Allergies   Allergen Reactions    Ciprofloxacin Itching    Ditropan [Oxybutynin Chloride] Itching    Lisinopril Itching    Sulfa (Sulfonamide Antibiotics) Itching    Toprol Xl [Metoprolol Succinate] Itching        Prior to Admission medications    Medication Sig Start Date End Date Taking? Authorizing Provider   escitalopram oxalate (LEXAPRO) 10 mg tablet TAKE 1 TABLET BY MOUTH DAILY. 4/26/17   Bill Vitale III, DO   amLODIPine (NORVASC) 5 mg tablet TAKE 1 TABLET BY MOUTH DAILY FOR BLOOD PRESSURE. 4/14/17   Candido Vitale III, DO   hydroCHLOROthiazide (HYDRODIURIL) 25 mg tablet TAKE 1 TABLET BY MOUTH DAILY FOR BLOOD PRESSURE. 4/14/17   Candido Vitale III, DO   eluxadoline (VIBERZI) 75 mg tablet Take 1 Tab by mouth two (2) times daily (with meals).  Max Daily Amount: 150 mg. 4/14/17   Leavy Carrel III, DO   potassium chloride SR (KLOR-CON 10) 10 mEq tablet TAKE 1 TABLET BY MOUTH DAILY FOR POTASSIUM. 2/24/17   Bill Vitale III, DO   donepezil (ARICEPT) 10 mg tablet TAKE 1 TABLET BY MOUTH EVERY NIGHT. 1/30/17   Bill Vitale III, DO   memantine (NAMENDA) 5 mg tablet TAKE 1 TABLET BY MOUTH DAILY. 9/26/16   Bill Vitale III, DO   fluorometholone (FML) 0.1 % ophthalmic suspension Administer 1 Drop to both eyes two (2) times a day. Historical Provider   suvorexant (BELSOMRA) 10 mg tablet Take 1 Tab by mouth nightly as needed for Insomnia. Max Daily Amount: 10 mg. 7/13/16   Santiago Mccartney III, DO   traMADol (ULTRAM) 50 mg tablet Take 1 Tab by mouth every six (6) hours as needed for Pain. Max Daily Amount: 200 mg. Indications: PAIN 7/5/16   Santiago Mccartney III, DO   Walker (ULTRA-LIGHT ROLLATOR) misc Dx: E81.62 M19.90 M25.551 3/2/16   Santiago Mccartney III, DO   meloxicam (MOBIC) 15 mg tablet Take 15 mg by mouth daily. Historical Provider   ibuprofen (MOTRIN) 200 mg tablet Take  by mouth. Historical Provider   omega-3 fatty acids-vitamin e 1,000 mg cap Take 1 Cap by mouth daily. Historical Provider   CALCIUM CARBONATE (CALCIUM 500 PO) Take  by mouth daily. Historical Provider       REVIEW OF SYSTEMS:     I am not able to complete the review of systems because:    The patient is intubated and sedated    The patient has altered mental status due to his acute medical problems    The patient has baseline aphasia from prior stroke(s)    The patient has baseline dementia and is not reliable historian    The patient is in acute medical distress and unable to provide information           Total of 12 systems reviewed as follows:       POSITIVE= underlined text  Negative = text not underlined  General:  fever, chills, sweats, generalized weakness, weight loss/gain,      loss of appetite   Eyes:    blurred vision, eye pain, loss of vision, double vision  ENT:    rhinorrhea, pharyngitis   Respiratory:   cough, sputum production, SOB, MOTT, wheezing, pleuritic pain   Cardiology:   chest pain, palpitations, orthopnea, PND, edema, syncope   Gastrointestinal:  abdominal pain , N/V, diarrhea, dysphagia, constipation, bleeding   Genitourinary:  frequency, urgency, dysuria, hematuria, incontinence   Muskuloskeletal :  arthralgia, myalgia, back pain  Hematology:  easy bruising, nose or gum bleeding, lymphadenopathy   Dermatological: rash, ulceration, pruritis, color change / jaundice  Endocrine:   hot flashes or polydipsia   Neurological:  headache, dizziness, confusion, focal weakness, paresthesia,     Speech difficulties, memory loss, gait difficulty  Psychological: Feelings of anxiety, depression, agitation    Objective:   VITALS:    Visit Vitals    /41    Pulse 65    Temp 98.1 °F (36.7 °C)    Resp 16    SpO2 91%       PHYSICAL EXAM:    General:    Alert, cooperative, no distress, appears stated age. HEENT: Atraumatic, anicteric sclerae, pink conjunctivae       mucosa moist,   dentition fair, ecchymosis over L eye  Neck:  Supple, symmetrical,  thyroid: non tender  Lungs:   Clear to auscultation bilaterally. No Wheezing or Rhonchi. No rales. Chest wall:   No Accessory muscle use. Heart:   Regular  rhythm,    No edema  Abdomen:   Soft, non-tender. Not distended. Bowel sounds normal  Extremities: No cyanosis. No clubbing,      Skin turgor normal,   Radial dial pulse 2+  Skin:     Not pale. Not Jaundiced     Psych:  Fair to poor insight. Not depressed. Not anxious or agitated. Neurologic: EOMs intact. No facial asymmetry. No aphasia or slurred speech. Symmetrical strength, Sensation grossly intact.  Alert and oriented X 2.     _______________________________________________________________________  Care Plan discussed with:    Comments   Patient X     Family  x    RN     Care Manager                    Consultant:      _______________________________________________________________________  Expected  Disposition:   Home with Family X HH/PT/OT/RN x   SNF/LTC    [facility]    ________________________________________________________________________  TOTAL TIME:    Minutes    Critical Care Provided     Minutes non procedure based      Comments     Reviewed previous records   >50% of visit spent in counseling and coordination of care  Discussion with patient and/or family and questions answered       ________________________________________________________________________  Signed: Per Hammond MD    Procedures: see electronic medical records for all procedures/Xrays and details which were not copied into this note but were reviewed prior to creation of Plan. LAB DATA REVIEWED:    Recent Results (from the past 24 hour(s))   CBC WITH AUTOMATED DIFF    Collection Time: 05/27/17  2:48 PM   Result Value Ref Range    WBC 24.3 (H) 3.6 - 11.0 K/uL    RBC 5.19 3.80 - 5.20 M/uL    HGB 15.5 11.5 - 16.0 g/dL    HCT 44.1 35.0 - 47.0 %    MCV 85.0 80.0 - 99.0 FL    MCH 29.9 26.0 - 34.0 PG    MCHC 35.1 30.0 - 36.5 g/dL    RDW 14.9 (H) 11.5 - 14.5 %    PLATELET 137 255 - 118 K/uL    NEUTROPHILS 84 (H) 32 - 75 %    BAND NEUTROPHILS 5 0 - 6 %    LYMPHOCYTES 2 (L) 12 - 49 %    MONOCYTES 7 5 - 13 %    EOSINOPHILS 1 0 - 7 %    BASOPHILS 1 0 - 1 %    ABS. NEUTROPHILS 21.7 (H) 1.8 - 8.0 K/UL    ABS. LYMPHOCYTES 0.5 (L) 0.8 - 3.5 K/UL    ABS. MONOCYTES 1.7 (H) 0.0 - 1.0 K/UL    ABS. EOSINOPHILS 0.2 0.0 - 0.4 K/UL    ABS.  BASOPHILS 0.2 (H) 0.0 - 0.1 K/UL    RBC COMMENTS NORMOCYTIC, NORMOCHROMIC      WBC COMMENTS VACUOLATED POLYS      DF MANUAL     METABOLIC PANEL, COMPREHENSIVE    Collection Time: 05/27/17  2:48 PM   Result Value Ref Range    Sodium 138 136 - 145 mmol/L    Potassium 3.2 (L) 3.5 - 5.1 mmol/L    Chloride 103 97 - 108 mmol/L    CO2 25 21 - 32 mmol/L    Anion gap 10 5 - 15 mmol/L    Glucose 93 65 - 100 mg/dL    BUN 68 (H) 6 - 20 MG/DL    Creatinine 1.96 (H) 0.55 - 1.02 MG/DL    BUN/Creatinine ratio 35 (H) 12 - 20      GFR est AA 30 (L) >60 ml/min/1.73m2 GFR est non-AA 25 (L) >60 ml/min/1.73m2    Calcium 8.6 8.5 - 10.1 MG/DL    Bilirubin, total 0.7 0.2 - 1.0 MG/DL    ALT (SGPT) 31 12 - 78 U/L    AST (SGOT) 42 (H) 15 - 37 U/L    Alk.  phosphatase 163 (H) 45 - 117 U/L    Protein, total 6.7 6.4 - 8.2 g/dL    Albumin 2.4 (L) 3.5 - 5.0 g/dL    Globulin 4.3 (H) 2.0 - 4.0 g/dL    A-G Ratio 0.6 (L) 1.1 - 2.2     TROPONIN I    Collection Time: 05/27/17  2:48 PM   Result Value Ref Range    Troponin-I, Qt. 0.15 (H) <0.05 ng/mL   EKG, 12 LEAD, INITIAL    Collection Time: 05/27/17  3:38 PM   Result Value Ref Range    Ventricular Rate 60 BPM    Atrial Rate 60 BPM    P-R Interval 140 ms    QRS Duration 82 ms    Q-T Interval 474 ms    QTC Calculation (Bezet) 474 ms    Calculated P Axis 14 degrees    Calculated R Axis 28 degrees    Calculated T Axis 63 degrees    Diagnosis       Sinus rhythm with premature atrial complexes  Low voltage QRS  Borderline ECG  When compared with ECG of 25-SEP-2016 18:58,  premature atrial complexes are now present

## 2017-05-27 NOTE — PROGRESS NOTES
Pharmacy  [facility] Monitoring     Enoxaparin Indication: DVT prophylaxis  Current Dose: 40  mg daily  Creatinine Clearance: unknown at this time  Recent Labs      05/27/17   1448   CREA  1.96*   BUN  68*   HGB  15.5   PLT  150     Wt Readings from Last 1 Encounters:   04/14/17 66.7 kg (147 lb)   There is no height or weight on file to calculate BMI.     Impression/Plan: will need to reassess the dose when crcl available, used micromedex calculator and ht and wt from 4/14/17 and Scr from today and the result was ~ 28 ml/min, however, her Scr is significantly higher that in the past, will leave as ordered for now     Thanks,  Bay Mehta

## 2017-05-28 ENCOUNTER — APPOINTMENT (OUTPATIENT)
Dept: MRI IMAGING | Age: 82
DRG: 853 | End: 2017-05-28
Attending: INTERNAL MEDICINE
Payer: MEDICARE

## 2017-05-28 PROBLEM — R41.82 ALTERED MENTAL STATUS: Status: ACTIVE | Noted: 2017-05-28
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
## 2017-05-28 PROBLEM — S32.000A CLOSED COMPRESSION FRACTURE OF LUMBOSACRAL SPINE (HCC): Status: ACTIVE | Noted: 2017-05-28
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
## 2017-05-28 PROBLEM — N17.9 ACUTE RENAL INJURY (HCC): Status: ACTIVE | Noted: 2017-05-28

## 2017-05-28 PROBLEM — R77.8 ELEVATED TROPONIN: Status: ACTIVE | Noted: 2017-05-28

## 2017-05-28 LAB
ANION GAP BLD CALC-SCNC: 11 MMOL/L (ref 5–15)
ATRIAL RATE: 60 BPM
BUN SERPL-MCNC: 59 MG/DL (ref 6–20)
BUN/CREAT SERPL: 38 (ref 12–20)
CALCIUM SERPL-MCNC: 8.3 MG/DL (ref 8.5–10.1)
CALCULATED P AXIS, ECG09: 14 DEGREES
CALCULATED R AXIS, ECG10: 28 DEGREES
CALCULATED T AXIS, ECG11: 63 DEGREES
CHLORIDE SERPL-SCNC: 103 MMOL/L (ref 97–108)
CO2 SERPL-SCNC: 25 MMOL/L (ref 21–32)
CREAT SERPL-MCNC: 1.54 MG/DL (ref 0.55–1.02)
CREAT UR-MCNC: 71.8 MG/DL
DIAGNOSIS, 93000: NORMAL
ERYTHROCYTE [DISTWIDTH] IN BLOOD BY AUTOMATED COUNT: 15 % (ref 11.5–14.5)
GLUCOSE SERPL-MCNC: 77 MG/DL (ref 65–100)
HCT VFR BLD AUTO: 45.7 % (ref 35–47)
HGB BLD-MCNC: 15.5 G/DL (ref 11.5–16)
MCH RBC QN AUTO: 29.1 PG (ref 26–34)
MCHC RBC AUTO-ENTMCNC: 33.9 G/DL (ref 30–36.5)
MCV RBC AUTO: 85.7 FL (ref 80–99)
P-R INTERVAL, ECG05: 140 MS
PLATELET # BLD AUTO: 129 K/UL (ref 150–400)
POTASSIUM SERPL-SCNC: 2.9 MMOL/L (ref 3.5–5.1)
Q-T INTERVAL, ECG07: 474 MS
QRS DURATION, ECG06: 82 MS
QTC CALCULATION (BEZET), ECG08: 474 MS
RBC # BLD AUTO: 5.33 M/UL (ref 3.8–5.2)
SODIUM SERPL-SCNC: 139 MMOL/L (ref 136–145)
SODIUM UR-SCNC: 34 MMOL/L
TROPONIN I SERPL-MCNC: 0.11 NG/ML
VENTRICULAR RATE, ECG03: 60 BPM
WBC # BLD AUTO: 13.6 K/UL (ref 3.6–11)

## 2017-05-28 PROCEDURE — 84484 ASSAY OF TROPONIN QUANT: CPT | Performed by: INTERNAL MEDICINE

## 2017-05-28 PROCEDURE — 74011250636 HC RX REV CODE- 250/636: Performed by: INTERNAL MEDICINE

## 2017-05-28 PROCEDURE — 74011000258 HC RX REV CODE- 258: Performed by: INTERNAL MEDICINE

## 2017-05-28 PROCEDURE — 85027 COMPLETE CBC AUTOMATED: CPT | Performed by: INTERNAL MEDICINE

## 2017-05-28 PROCEDURE — 80048 BASIC METABOLIC PNL TOTAL CA: CPT | Performed by: INTERNAL MEDICINE

## 2017-05-28 PROCEDURE — 36415 COLL VENOUS BLD VENIPUNCTURE: CPT | Performed by: INTERNAL MEDICINE

## 2017-05-28 PROCEDURE — 74011250637 HC RX REV CODE- 250/637: Performed by: INTERNAL MEDICINE

## 2017-05-28 PROCEDURE — 77030011256 HC DRSG MEPILEX <16IN NO BORD MOLN -A

## 2017-05-28 PROCEDURE — 65660000000 HC RM CCU STEPDOWN

## 2017-05-28 PROCEDURE — 72148 MRI LUMBAR SPINE W/O DYE: CPT

## 2017-05-28 RX ORDER — LORAZEPAM 2 MG/ML
0.25 INJECTION, SOLUTION INTRAMUSCULAR; INTRAVENOUS ONCE
Status: COMPLETED | OUTPATIENT
Start: 2017-05-28 | End: 2017-05-28

## 2017-05-28 RX ORDER — ENOXAPARIN SODIUM 100 MG/ML
30 INJECTION SUBCUTANEOUS EVERY 24 HOURS
Status: DISCONTINUED | OUTPATIENT
Start: 2017-05-28 | End: 2017-06-03 | Stop reason: HOSPADM

## 2017-05-28 RX ADMIN — ENOXAPARIN SODIUM 30 MG: 60 INJECTION SUBCUTANEOUS at 17:28

## 2017-05-28 RX ADMIN — HYDROCODONE BITARTRATE AND ACETAMINOPHEN 1 TABLET: 5; 325 TABLET ORAL at 22:06

## 2017-05-28 RX ADMIN — AMLODIPINE BESYLATE 5 MG: 5 TABLET ORAL at 09:51

## 2017-05-28 RX ADMIN — CEFTRIAXONE 1 G: 1 INJECTION, POWDER, FOR SOLUTION INTRAMUSCULAR; INTRAVENOUS at 22:06

## 2017-05-28 RX ADMIN — Medication 10 ML: at 14:39

## 2017-05-28 RX ADMIN — LORAZEPAM 0.3 MG: 2 INJECTION, SOLUTION INTRAMUSCULAR; INTRAVENOUS at 00:03

## 2017-05-28 RX ADMIN — MEMANTINE 5 MG: 10 TABLET ORAL at 09:51

## 2017-05-28 RX ADMIN — ESCITALOPRAM OXALATE 10 MG: 10 TABLET ORAL at 09:51

## 2017-05-28 RX ADMIN — HYDROCODONE BITARTRATE AND ACETAMINOPHEN 1 TABLET: 5; 325 TABLET ORAL at 07:05

## 2017-05-28 RX ADMIN — Medication 10 ML: at 22:06

## 2017-05-28 RX ADMIN — HYDROCODONE BITARTRATE AND ACETAMINOPHEN 1 TABLET: 5; 325 TABLET ORAL at 12:55

## 2017-05-28 RX ADMIN — DONEPEZIL HYDROCHLORIDE 10 MG: 5 TABLET, FILM COATED ORAL at 22:06

## 2017-05-28 NOTE — PROGRESS NOTES
Bedside and Verbal shift change report given to Dina Barrett (oncoming nurse) by Tj Saab (offgoing nurse). Report given with SBAR, Kardex, Intake/Output, MAR and Recent Results.

## 2017-05-28 NOTE — PROGRESS NOTES
Per nursing supervisor IR will only be called in for STAT consult with consult listed as routine.   Will notify oncoming shift

## 2017-05-28 NOTE — PROGRESS NOTES
* No surgery found *  Bedside shift change report given to Matt Bartholomew (oncoming nurse) by Asmita Reddy (offgoing nurse). Report included the following information SBAR, Kardex, Intake/Output, MAR and Recent Results. Zone Phone:   [phone]      Significant changes during shift:  None, sacrum is red but blanchable still, pain medication given once, little appetite,         Patient Information    Flaquita Ha  [de-identified] y.o.  5/27/2017  2:01 PM by Gucci Hutchinson MD. Flaquita Ha was admitted from Assisted Living    Problem List    Patient Active Problem List    Diagnosis Date Noted    Altered mental status 05/28/2017     Class: Acute    Acute renal injury ([address] 05/28/2017     Class: Acute    Fall at home 05/28/2017     Class: Present on Admission    Elevated troponin 05/28/2017     Class: Present on Admission    Closed compression fracture of lumbosacral spine ([address] 05/28/2017     Class: Present on Admission    UTI (urinary tract infection) 05/27/2017    Depression 01/13/2017    Diarrhea 01/13/2017    HTN (hypertension) 11/30/2015    Dementia 11/30/2015    Mixed incontinence urge and stress 08/17/2011     Past Medical History:   Diagnosis Date    Arthritis     hands    Hypertension     Incontinence of urine     Other ill-defined conditions     freq UTIs    Thromboembolus ([address] 5-2011    left leg    Unspecified adverse effect of anesthesia     PONV         Core Measures:    CVA: No No  CHF:No No  PNA:No No    Activity Status:    OOB to Chair No  Ambulated this shift No   Bed Rest No    Supplemental O2: (If Applicable)    NC No  NRB No  Venti-mask No      LINES AND DRAINS:    Central Line? No     PICC LINE? No     Urinary Catheter? No     DVT prophylaxis:    DVT prophylaxis Med- Yes  DVT prophylaxis SCD or FIDENCIO- No     Wounds: (If Applicable)    Wounds- No      Patient Safety:    Falls Score Total Score: 5  Safety Level_______  Bed Alarm On? Yes  Sitter?  No    Plan for upcoming shift: continue to monitor Discharge Plan: none    Active Consults:  IP CONSULT TO INTERVENTIONAL RADIOLOGY

## 2017-05-29 PROBLEM — A41.50 SEPSIS, GRAM NEGATIVE (HCC): Status: ACTIVE | Noted: 2017-05-29

## 2017-05-29 LAB
BACTERIA SPEC CULT: NORMAL
CC UR VC: NORMAL
SERVICE CMNT-IMP: NORMAL

## 2017-05-29 PROCEDURE — 97161 PT EVAL LOW COMPLEX 20 MIN: CPT

## 2017-05-29 PROCEDURE — G8979 MOBILITY GOAL STATUS: HCPCS

## 2017-05-29 PROCEDURE — G8988 SELF CARE GOAL STATUS: HCPCS | Performed by: OCCUPATIONAL THERAPIST

## 2017-05-29 PROCEDURE — G8978 MOBILITY CURRENT STATUS: HCPCS

## 2017-05-29 PROCEDURE — 97166 OT EVAL MOD COMPLEX 45 MIN: CPT | Performed by: OCCUPATIONAL THERAPIST

## 2017-05-29 PROCEDURE — 74011250636 HC RX REV CODE- 250/636: Performed by: EMERGENCY MEDICINE

## 2017-05-29 PROCEDURE — 65660000000 HC RM CCU STEPDOWN

## 2017-05-29 PROCEDURE — 74011250636 HC RX REV CODE- 250/636: Performed by: INTERNAL MEDICINE

## 2017-05-29 PROCEDURE — 74011000258 HC RX REV CODE- 258: Performed by: INTERNAL MEDICINE

## 2017-05-29 PROCEDURE — 97530 THERAPEUTIC ACTIVITIES: CPT | Performed by: OCCUPATIONAL THERAPIST

## 2017-05-29 PROCEDURE — 97530 THERAPEUTIC ACTIVITIES: CPT

## 2017-05-29 PROCEDURE — 74011250637 HC RX REV CODE- 250/637: Performed by: INTERNAL MEDICINE

## 2017-05-29 PROCEDURE — G8987 SELF CARE CURRENT STATUS: HCPCS | Performed by: OCCUPATIONAL THERAPIST

## 2017-05-29 RX ORDER — ASPIRIN 81 MG/1
81 TABLET ORAL DAILY
Status: DISCONTINUED | OUTPATIENT
Start: 2017-05-30 | End: 2017-06-03 | Stop reason: HOSPADM

## 2017-05-29 RX ADMIN — CEFTRIAXONE 1 G: 1 INJECTION, POWDER, FOR SOLUTION INTRAMUSCULAR; INTRAVENOUS at 21:21

## 2017-05-29 RX ADMIN — HYDROCODONE BITARTRATE AND ACETAMINOPHEN 1 TABLET: 5; 325 TABLET ORAL at 18:20

## 2017-05-29 RX ADMIN — MEMANTINE 5 MG: 10 TABLET ORAL at 08:57

## 2017-05-29 RX ADMIN — SODIUM CHLORIDE 75 ML/HR: 900 INJECTION, SOLUTION INTRAVENOUS at 14:51

## 2017-05-29 RX ADMIN — Medication 10 ML: at 21:21

## 2017-05-29 RX ADMIN — AMLODIPINE BESYLATE 5 MG: 5 TABLET ORAL at 08:57

## 2017-05-29 RX ADMIN — ENOXAPARIN SODIUM 30 MG: 60 INJECTION SUBCUTANEOUS at 18:20

## 2017-05-29 RX ADMIN — HYDROCODONE BITARTRATE AND ACETAMINOPHEN 1 TABLET: 5; 325 TABLET ORAL at 13:49

## 2017-05-29 RX ADMIN — ESCITALOPRAM OXALATE 10 MG: 10 TABLET ORAL at 08:57

## 2017-05-29 RX ADMIN — DONEPEZIL HYDROCHLORIDE 10 MG: 5 TABLET, FILM COATED ORAL at 21:21

## 2017-05-29 RX ADMIN — Medication 10 ML: at 15:50

## 2017-05-29 NOTE — PROGRESS NOTES
Problem: Self Care Deficits Care Plan (Adult)  Goal: *Acute Goals and Plan of Care (Insert Text)  Occupational Therapy Goals  Initiated 5/29/2017  1. Patient will perform grooming while standing at the sink with supervision within 7 day(s). 2. Patient will perform upper body dressing with supervision/set-up within 7 day(s). 3. Patient will perform lower body dressing with minimal assistance/contact guard assist within 7 day(s). 4. Patient will perform toilet transfers with supervision/set-up within 7 day(s). 5. Patient will perform all aspects of toileting with minimal assistance within 7 day(s). OCCUPATIONAL THERAPY EVALUATION  Patient: Morena Shafer (89 y.o. female)  Date: 5/29/2017  Primary Diagnosis: UTI (urinary tract infection)        Precautions:  Fall      ASSESSMENT :  Based on the objective data described below, the patient presents with significant deficits in self-care, primarily due to decreased dynamic balance, decreased problem-solving, decreased safety/judgement, delayed processing, decreased STM, decreased activity tolerance, and general weakness. She was a poor historian, often answering questions about prior level of function with \"I have no idea. \"  She was living in an [place] [address] apartment at [facility], but is not safe to return there right now. Patient was left up in the chair with call bell and phone; bed alarm in place. Reviewed use of call bell, specifically how to get assistance if needed. She will benefit from skilled OT treatment to maximize independence and safety with ADL. Recommend rehab in a skilled nursing facility upon discharge. Patient will benefit from skilled intervention to address the above impairments.   Patients rehabilitation potential is considered to be Fair  Factors which may influence rehabilitation potential include:   [ ]             None noted  [X]             Mental ability/status  [ ]             Medical condition  [ ] Home/family situation and support systems  [ ]             Safety awareness  [ ]             Pain tolerance/management  [ ]             Other:        PLAN :  Recommendations and Planned Interventions:  [X]               Self Care Training                  [X]        Therapeutic Activities  [X]               Functional Mobility Training    [X]        Cognitive Retraining  [X]               Therapeutic Exercises           [X]        Endurance Activities  [X]               Balance Training                   [ ]        Neuromuscular Re-Education  [ ]               Visual/Perceptual Training     [X]   Home Safety Training  [X]               Patient Education                 [X]        Family Training/Education  [ ]               Other (comment):     Frequency/Duration: Patient will be followed by occupational therapy 4 times a week to address goals. Discharge Recommendations: [facility]  Further Equipment Recommendations for Discharge: Defer to facility       SUBJECTIVE:   Patient stated I have no idea.       OBJECTIVE DATA SUMMARY:   HISTORY:   Past Medical History:   Diagnosis Date    Arthritis       hands    Hypertension      Incontinence of urine      Other ill-defined conditions       freq UTIs    [address] [address] 5-2011     left leg    Unspecified adverse effect of anesthesia       PONV     Past Surgical History:   Procedure Laterality Date    BREAST SURGERY PROCEDURE UNLISTED         right breast bx x2    HX CATARACT REMOVAL   2008     zoe    HX CHOLECYSTECTOMY        HX UROLOGICAL   2006     bladder sling    VASCULAR SURGERY PROCEDURE UNLIST   6-2011     left leg vein procedure DVT        Prior Level of Function/Home Situation: She was living in [place] [address] with family checking on her regularly.   Expanded or extensive additional review of patient history: History of dementia     Home Situation  Home Environment: [place] [address] Name: [facility]  One/Two Story Residence: One story  Living Alone: Yes  Support Systems: Child(jm), Family member(s)  Patient Expects to be Discharged to[de-identified] Assisted living  Current DME Used/Available at Home: [address], rollator  [X]  Right hand dominant             [ ]  Left hand dominant     EXAMINATION OF PERFORMANCE DEFICITS:  Cognitive/Behavioral Status:  Neurologic State: Alert;Confused  Orientation Level: Oriented to person;Disoriented to place  Cognition: Decreased command following;Poor safety awareness; Impaired decision making;Memory loss  Perception: Appears intact  Perseveration: No perseveration noted  Safety/Judgement: Awareness of environment;Decreased insight into deficits; Decreased awareness of need for safety;Decreased awareness of need for assistance        Hearing: Auditory  Auditory Impairment: None     Vision/Perceptual:         Acuity: Within Defined Limits (grossly)    Corrective Lenses: Glasses (bifocals)     Range of Motion:  AROM: Generally decreased, functional                          Strength:  Strength: Generally decreased, functional                    Balance:  Sitting: Intact  Standing: Impaired  Standing - Static: Fair  Standing - Dynamic : Fair     Functional Mobility and Transfers for ADLs:  Bed Mobility:  Rolling: Supervision  Supine to Sit: Supervision     Transfers:  Sit to Stand: Minimum assistance  Stand to Sit: Minimum assistance  Toilet Transfer : Minimum assistance     ADL Assessment:  Feeding: Supervision     Oral Facial Hygiene/Grooming: Minimum assistance     Bathing:  Moderate assistance     Upper Body Dressing: Minimum assistance     Lower Body Dressing: Maximum assistance     Toileting: Maximum assistance                 Functional Measure:  Barthel Index:      Bathin  Bladder: 0  Bowels: 5  Groomin  Dressin  Feedin  Mobility: 0  Stairs: 0  Toilet Use: 5  Transfer (Bed to Chair and Back): 10  Total: 30         Barthel and G-code impairment scale:  Percentage of impairment CH  0% CI  1-19% CJ  20-39% CK  40-59% CL  60-79% CM  80-99% CN  100%   Barthel Score 0-100 100 99-80 79-60 59-40 20-39 1-19    0   Barthel Score 0-20 20 17-19 13-16 9-12 5-8 1-4 0      The Barthel ADL Index: Guidelines  1. The index should be used as a record of what a patient does, not as a record of what a patient could do. 2. The main aim is to establish degree of independence from any help, physical or verbal, however minor and for whatever reason. 3. The need for supervision renders the patient not independent. 4. A patient's performance should be established using the best available evidence. Asking the patient, friends/relatives and nurses are the usual sources, but direct observation and common sense are also important. However direct testing is not needed. 5. Usually the patient's performance over the preceding 24-48 hours is important, but occasionally longer periods will be relevant. 6. Middle categories imply that the patient supplies over 50 per cent of the effort. 7. Use of aids to be independent is allowed. Odilia Cano., Barthel, D.W. (4299). Functional evaluation: the Barthel Index. [facility] (14)2. DEBBIE Zuñiga, Veto Rowley., [place] [place], [address] [place] [address] (1999). Measuring the change indisability after inpatient rehabilitation; comparison of the responsiveness of the Barthel Index and Functional Door Measure. Journal of Neurology, Neurosurgery, and Psychiatry, 66(4), 011-271. YANDY Fong.SOHEILA, HARI Nair, & Kiara Candelaria, MEricA. (2004.) Assessment of post-stroke quality of life in cost-effectiveness studies: The usefulness of the Barthel Index and the EuroQoL-5D. Quality of Life Research, 13, 108-88         G codes: In compliance with CMSs Claims Based Outcome Reporting, the following G-code set was chosen for this patient based on their primary functional limitation being treated:      The outcome measure chosen to determine the severity of the functional limitation was the Barthel Index with a score of 30/100 which was correlated with the impairment scale. · Self Care:               - CURRENT STATUS:    CL - 60%-79% impaired, limited or restricted               - GOAL STATUS:           CJ - 20%-39% impaired, limited or restricted               - D/C STATUS:                       ---------------To be determined---------------      Occupational Therapy Evaluation Charge Determination   History Examination Decision-Making   MEDIUM Complexity : Expanded review of history including physical, cognitive and psychosocial  history  MEDIUM Complexity : 3-5 performance deficits relating to physical, cognitive , or psychosocial skils that result in activity limitations and / or participation restrictions MEDIUM Complexity : Patient may present with comorbidities that affect occupational performnce. Miniml to moderate modification of tasks or assistance (eg, physical or verbal ) with assesment(s) is necessary to enable patient to complete evaluation       Based on the above components, the patient evaluation is determined to be of the following complexity level: MEDIUM  Pain:  Patient c/o discomfort in her lower back during activity, especially when in the bathroom. She did not rate, but pain seemed to improve some with changes in position. Activity Tolerance:   Poor  Please refer to the flowsheet for vital signs taken during this treatment. After treatment:   [X] Patient left in no apparent distress sitting up in chair  [ ] Patient left in no apparent distress in bed  [X] Call bell left within reach  [X] Nursing notified  [X] Caregiver present  [X] Bed alarm activated      COMMUNICATION/EDUCATION:   The patients plan of care was discussed with: Physical Therapist and Registered Nurse.  [X] Home safety education was provided and the patient/caregiver indicated understanding.   [ ] Patient/family have participated as able in goal setting and plan of care. [ ] Patient/family agree to work toward stated goals and plan of care. [ ] Patient understands intent and goals of therapy, but is neutral about his/her participation. [X] Patient is unable to participate in goal setting and plan of care. This patients plan of care is appropriate for delegation to FERNANDA.      Thank you for this referral.  Leesa Gabriel OTR/L  Time Calculation: 36 mins

## 2017-05-29 NOTE — PROGRESS NOTES
* No surgery found *  Bedside shift change report given to Yuliana (oncoming nurse) by Destiny Amador (offgoing nurse). Report included the following information SBAR, Kardex, Intake/Output, MAR and Recent Results. Zone Phone:   [phone]      Significant changes during shift:  Preliminary results for blood cx resulted growing gram neg rods- called and spoke with on call MD Morrison, no new orders received. Patient Information    Nohemy Barajas  [de-identified] y.o.  5/27/2017  2:01 PM by Estefany Jacome MD. Nohemy Barajas was admitted from [facility]    Problem List    Patient Active Problem List    Diagnosis Date Noted    Altered mental status 05/28/2017     Class: Acute    Acute renal injury ([facility] [address] 05/28/2017     Class: Acute    Fall at home 05/28/2017     Class: Present on Admission    Elevated troponin 05/28/2017     Class: Present on Admission    Closed compression fracture of lumbosacral spine ([address] 05/28/2017     Class: Present on Admission    UTI (urinary tract infection) 05/27/2017    Depression 01/13/2017    Diarrhea 01/13/2017    HTN (hypertension) 11/30/2015    Dementia 11/30/2015    Mixed incontinence urge and stress 08/17/2011     Past Medical History:   Diagnosis Date    Arthritis     hands    Hypertension     Incontinence of urine     Other ill-defined conditions     freq UTIs    Thromboembolus ([address] 5-2011    left leg    Unspecified adverse effect of anesthesia     PONV         Core Measures:    CVA: No No  CHF:No No  PNA:No No    Activity Status:    OOB to Chair No  Ambulated this shift No   Bed Rest No    Supplemental O2: (If Applicable)    NC No  NRB No  Venti-mask No      LINES AND DRAINS:    Central Line? No     PICC LINE? No     Urinary Catheter? No     DVT prophylaxis:    DVT prophylaxis Med- Yes  DVT prophylaxis SCD or FIDENCIO- No     Wounds: (If Applicable)    Wounds- No      Patient Safety:    Falls Score Total Score: 5  Safety Level_______  Bed Alarm On? Yes  Sitter?  No    Plan for upcoming shift: continue to monitor         Discharge Plan: none    Active Consults:  IP CONSULT TO INTERVENTIONAL RADIOLOGY

## 2017-05-29 NOTE — ROUTINE PROCESS
Bedside shift change report given to Clay Calvillo (oncoming nurse) by Alexandra Lewis (offgoing nurse). Report included the following information SBAR, Kardex, Intake/Output, MAR and Recent Results.     Zone Phone: [phone]        Significant changes during shift: none, gave pain med x 2 today.           Patient Information     Will Alter  [de-identified] y.o.  5/27/2017 2:01 PM by Abram Thibodeaux MD. Will Alter was admitted from [address] [place] [address] [address]  Problem List           Patient Active Problem List     Diagnosis Date Noted    Altered mental status 05/28/2017       Class: Acute    Acute renal injury ([address] 05/28/2017       Class: Acute    Fall at home 05/28/2017       Class: Present on Admission    Elevated troponin 05/28/2017       Class: Present on Admission    Closed compression fracture of lumbosacral spine ([address] 05/28/2017       Class: Present on Admission    UTI (urinary tract infection) 05/27/2017    Depression 01/13/2017    Diarrhea 01/13/2017    HTN (hypertension) 11/30/2015    Dementia 11/30/2015    Mixed incontinence urge and stress 08/17/2011           Past Medical History:   Diagnosis Date    Arthritis       hands    Hypertension      Incontinence of urine      Other ill-defined conditions       freq UTIs    Thromboembolus ([address] 5-2011     left leg    Unspecified adverse effect of anesthesia       PONV            Core Measures:     CVA: No No  CHF:No No  PNA:No No     Activity Status:     OOB to Chair YEs  Ambulated this shift No   Bed Rest No     Supplemental O2: (If Applicable)     NC No  NRB No  Venti-mask No        LINES AND DRAINS:     Central Line? No      PICC LINE? No      Urinary Catheter? No      DVT prophylaxis:     DVT prophylaxis Med- Yes  DVT prophylaxis SCD or FIDENCIO- No      Wounds: (If Applicable)     Wounds- No        Patient Safety:     Falls Score Total Score: 5  Safety Level_______  Bed Alarm On? Yes  Sitter?  No     Plan for upcoming shift: continue to monitor            Discharge Plan: none     Active Consults:  IP CONSULT TO INTERVENTIONAL RADIOLOGY

## 2017-05-29 NOTE — PROGRESS NOTES
Progress Note          Pt Name  Екатерина Quezada   Date of Birth 1935   Medical Record Number  [id number]      Age  [de-identified] y.o. PCP Rajesh Bishop III, DO   Admit date:  5/27/2017    Room Number  8331/46  @ [facility]   Date of Service  5/29/2017     Admission Diagnoses:  Sepsis, Gram negative ([facility] [address]     Assessment and plan:     Present on Admission:   Sepsis -with gram negative bacteremia    UTI (urinary tract infection) - GNR    Altered mental status - improving    Elevated troponin - NSTEMI     Fall at home -    Closed compression fracture of lumbosacral spine (HCC)   Acute renal injury ([facility] [address]     Depression   Mixed incontinence urge and stress   HTN (hypertension)   Dementia      Body mass index is 23.76 kg/(m^2). PLAN  · Given her age, dementia and functional capacity - we will treat her conservatively for SNTEMI   · Start ASA 81 mg daily   · No change in abx for now. Await final ID from Blood and urine culture. · We will check Blood culture daily and await negative results which will be our index date for two weeks of Rx. · Asked IR to look at her MRI if opine if kyphoplasty is indicated - I doubt she is a candidate for any procedure due to her bacteremia   · Supportive care for her dementia  · AMS might have been contributed by UTI - now improving   · PT OT to see her next working day         CODE STATUS   DNR   Functional Status  Pt resides in an independent living facility   Her daughter keeps close eye on her     Surrogate decision maker:  Pt's daughter      Prophylaxis   Lovenox   Discharge Plan:   PT, OT, RN,      Misc   Benefit:  Payor: [place] MEDICARE / Plan: VA MEDICARE PART A & B / Product Type: Medicare /    Isolation :  There are currently no Active Isolations   ADT status:  INPATIENT      Query   None noted today    Prognosis   Fair    Social issues  Date  Comment     05/28/17 I met with the pt's daughter in her room.  We had extensive discussion about clinical issues. I explained above plans in simple language and hand wrote most of the above diagnoses in notepad for their reference    05/29/17 I met with pt's other daughter in the room and we explained all issues in great detail and answered all questions                Subjective Data     \"OK \"  Review of Systems - History obtained from unobtainable from patient due to mental status    Objective Data       Comments  Elderly lady who is much improved and engaged   Her daughter is at her side      Patient Vitals for the past 24 hrs:   BP   05/29/17 1019 135/56   05/29/17 1009 126/64   05/29/17 0749 148/66   05/29/17 0351 157/77   05/28/17 2337 134/65   05/28/17 1930 165/78   05/28/17 1554 131/61   05/28/17 1152 126/76    Patient Vitals for the past 24 hrs:   Pulse   05/29/17 1019 68   05/29/17 1009 62   05/29/17 0749 63   05/29/17 0351 70   05/28/17 2337 63   05/28/17 1930 75   05/28/17 1554 60   05/28/17 1152 (!) 55    Patient Vitals for the past 24 hrs:   Resp   05/29/17 0749 16   05/29/17 0351 16   05/28/17 2337 16   05/28/17 1930 16   05/28/17 1554 14   05/28/17 1152 14    Patient Vitals for the past 24 hrs:   Temp   05/29/17 0749 99.2 °F (37.3 °C)   05/29/17 0351 98.3 °F (36.8 °C)   05/28/17 2337 99.3 °F (37.4 °C)   05/28/17 1930 99.7 °F (37.6 °C)   05/28/17 1554 98.7 °F (37.1 °C)   05/28/17 1152 97.8 °F (36.6 °C)        SpO2 Readings from Last 6 Encounters:   05/29/17 91%   04/14/17 94%   01/27/17 94%   01/13/17 94%   10/13/16 94%   09/25/16 92%          O2 Device: Room air Body mass index is 23.76 kg/(m^2).  -  Wt Readings from Last 10 Encounters:   05/28/17 65.8 kg (145 lb)   04/14/17 66.7 kg (147 lb)   01/27/17 65.3 kg (144 lb)   01/13/17 66.7 kg (147 lb)   10/13/16 71.2 kg (157 lb)   09/25/16 76.2 kg (168 lb)   07/13/16 76.5 kg (168 lb 9.6 oz)   03/24/16 78.9 kg (174 lb)   01/27/16 82.9 kg (182 lb 12.8 oz)   01/20/16 81.6 kg (180 lb)        Physical Exam:             General:  Alert, cooperative, Follows command participates in conversation   well noursished,   well developed,   appears stated age    Ears/Eyes:  Hearing intact  Sclera anicteric. Pupils equal   Mouth/Throat:  Mucous membranes normal pink and moist  Oral pharynx clear    Neck:     Lungs:  Trachea midline  Chest excursion symmetrical   Auscultation B/L Symmetrical with   Vesicular breath sounds     No Crepitations noted   Percussion note resonant on mid Clavicular line; no sign of pneumothorax        CVS:  Regular rate and rhythm   no  murmur,   No click, rub or gallop  S1 normal   S2 normal   Pedal pulses  b/l symmetrical   Abdomen:    Soft, non-tender  Bowel sounds normal  No distension   Percussion note tympanitic   Extremities:    No cyanosis, jaundice  No edema noted   No sign of DVT/cord like lesion on palpation  No sign of acute trauma   Age appropriate OA changes noted. Skin:    Skin color, texture, turgor normal. no acute rash or lesions    Lymph nodes:     Musculoskeletal Muscle bulk B/L symmetrical   Neuro Cranial nerves are intact,   motor movement b/l symmetrical,   Sensory evaluation b/l symmetrical    Psych:  Alert and oriented to self and family             Medications reviewed     Current Facility-Administered Medications   Medication Dose Route Frequency    enoxaparin (LOVENOX) injection 30 mg  30 mg SubCUTAneous Q24H    0.9% sodium chloride infusion  75 mL/hr IntraVENous CONTINUOUS    memantine (NAMENDA) tablet 5 mg  5 mg Oral DAILY    escitalopram oxalate (LEXAPRO) tablet 10 mg  10 mg Oral DAILY    amLODIPine (NORVASC) tablet 5 mg  5 mg Oral DAILY    donepezil (ARICEPT) tablet 10 mg  10 mg Oral QHS    . PHARMACY TO SUBSTITUTE PER PROTOCOL    Per Protocol    sodium chloride (NS) flush 5-10 mL  5-10 mL IntraVENous Q8H    sodium chloride (NS) flush 5-10 mL  5-10 mL IntraVENous PRN    acetaminophen (TYLENOL) tablet 650 mg  650 mg Oral Q4H PRN    HYDROcodone-acetaminophen (NORCO) 5-325 mg per tablet 1 Tab  1 Tab Oral Q4H PRN    naloxone (NARCAN) injection 0.4 mg  0.4 mg IntraVENous PRN    cefTRIAXone (ROCEPHIN) 1 g in 0.9% sodium chloride (MBP/ADV) 50 mL  1 g IntraVENous Q24H       Relevant other informations: Other medical conditions listed in [facility] problem list section; all of these and other pertinent data were taken into consideration when treatment plan is developed and customized to this patient's unique overall circumstances and needs. We have reviewed available old medical records within the constraints of this admission process. Data Review:   Recent Days:  All Micro Results     Procedure Component Value Units Date/Time    CULTURE, URINE [[id number]] Collected:  05/27/17 1651    Order Status:  Completed Specimen:  Urine Updated:  05/29/17 1011     Special Requests: --        NO SPECIAL REQUESTS  Reflexed from [id number]       Xenia Count --        >100,000  COLONIES/mL       Culture result:         >2 ORGANISMS - CONTAMINATED SPECIMEN. SUGGEST RECOLLECTION    CULTURE, BLOOD [[id number]]  (Abnormal) Collected:  05/27/17 1812    Order Status:  Completed Specimen:  Blood from Blood Updated:  05/29/17 0126     Special Requests: NO SPECIAL REQUESTS        Culture result:       GRAM NEGATIVE RODS  GROWING IN THIS SINGLE BOTTLE DRAWN  (L AC SITE)   (A)            PRELIMINARY REPORT OF  GRAM NEGATIVE RODS  GROWING IN THIS SINGLE BOTTLE DRAWN  CALLED TO AND READ BACK BY  MS MARGARITO RN ON 5/29/17 AT 12 ([address]).  MS   (A)          Recent Labs      05/28/17   0323  05/27/17   1448   WBC  13.6*  24.3*   HGB  15.5  15.5   HCT  45.7  44.1   PLT  129*  150     Recent Labs      05/28/17   0323  05/27/17   1448   NA  139  138   K  2.9*  3.2*   CL  103  103   CO2  25  25   GLU  77  93   BUN  59*  68*   CREA  1.54*  1.96*   CA  8.3*  8.6   ALB   --   2.4*   TBILI   --   0.7   SGOT   --   42*   ALT   --   31      Lab Results   Component Value Date/Time    TSH 4.600 07/14/2016 11:16 AM            Care Plan discussed with:Patient/Family and Nurse   Other medical conditions are listed in the active hospital problem list section; these and other pertinent data were taken into consideration when the treatment plan was developed and customized to this patient's unique overall circumstances and needs.     High complexity decision making was performed for this patient who is at high risk for decompensation with multiple organ involvement.    Today total floor/unit time was 45  minutes while caring for this patient and greater than 50% of that time was spent with patient (and/or family) coordinating patients clinical issues; this includes time spent during multidisciplinary rounds. Luz Maria Cedeño MD MPH FACP    5/29/2017

## 2017-05-29 NOTE — PROGRESS NOTES
Initial Nutrition Assessment:    INTERVENTIONS/RECOMMENDATIONS:   · Continue current diet  · Ensure Clear and Magic Cup BID, Ensure pudding daily  · Family was encouraged to bring in pt favorite foods  · Last documented BM was 5/26, may need bowel regimen  · Consider SLP eval    ASSESSMENT:   Chart reviewed, medically noted for confusion, ARF, HTN, Dementia, L5 compression fracture. Pt was present with daughter today during visit. Daughter reports of recent poor PO intake specifically noting drastic change since January, expressing she feels that pt is forgets she is eating and does not finish food. Also mentions pt pocketing food, may benefit from SLP eval. Pt agreed to try a variety of ONS to help meet nutrition needs. Past Medical History:   Diagnosis Date    Arthritis     hands    Hypertension     Incontinence of urine     Other ill-defined conditions     freq UTIs    Thromboembolus ([facility] [address] 5-2011    left leg    Unspecified adverse effect of anesthesia     PONV       Diet Order: Regular  % Eaten:  Patient Vitals for the past 72 hrs:   % Diet Eaten   05/28/17 1956 25 %     Pertinent Medications: [x]Reviewed []Other  Pertinent Labs: [x]Reviewed []Other (K+ 2.9, elevated AST and Alk Phos)  Food Allergies: [x]NKFA  []Other   Last BM: 5/26     Skin:    [x] Intact   [] Incision  [] Breakdown  [] Other:     Wt Readings from Last 30 Encounters:   05/28/17 65.8 kg (145 lb)   04/14/17 66.7 kg (147 lb)   01/27/17 65.3 kg (144 lb)   01/13/17 66.7 kg (147 lb)   10/13/16 71.2 kg (157 lb)   09/25/16 76.2 kg (168 lb)   07/13/16 76.5 kg (168 lb 9.6 oz)   03/24/16 78.9 kg (174 lb)   01/27/16 82.9 kg (182 lb 12.8 oz)   01/20/16 81.6 kg (180 lb)   12/28/15 84.2 kg (185 lb 9.6 oz)   11/30/15 85.7 kg (189 lb)   11/16/15 86.6 kg (191 lb)   09/10/15 87.7 kg (193 lb 5.5 oz)   08/16/11 83 kg (183 lb)   08/05/11 83 kg (183 lb)       Anthropometrics:   Height:   Weight: 65.8 kg (145 lb)   IBW (%IBW):   ( ) UBW (%UBW):   (  %) Last Weight Metrics:  Weight Loss Metrics 5/28/2017 4/14/2017 1/27/2017 1/13/2017 10/13/2016 9/25/2016 7/13/2016   Today's Wt 145 lb 147 lb 144 lb 147 lb 157 lb 168 lb 168 lb 9.6 oz   BMI 23.76 kg/m2 24.09 kg/m2 23.6 kg/m2 24.09 kg/m2 25.73 kg/m2 27.53 kg/m2 28.93 kg/m2       BMI: Body mass index is 23.76 kg/(m^2). This BMI is indicative of:   []Underweight    [x]Normal    []Overweight    [] Obesity   [] Extreme Obesity (BMI>40)     Estimated Nutrition Needs (Based on):   1475 Kcals/day (MSJ: 1125 x 1.3) , 80 g (1.2 g/kg) Protein  Carbohydrate: At Least 130 g/day  Fluids: 1475 mL/day (1ml/kcal) or per primary team    NUTRITION DIAGNOSES:   Problem:  Inadequate oral food/beverage intake Unintended weight loss    Etiology: related to poor appetite poor appetite   Signs/Symptoms: as evidenced by family report poor PO intake 14% wt loss in ~1 year    NUTRITION INTERVENTIONS:  Meals/Snacks: General/healthful diet   Supplements: Commercial supplement              GOAL:   consume >50% of meals and ONS in 2-4 days    LEARNING NEEDS (Diet, Food/Nutrient-Drug Interaction):    [x] None Identified   [] Identified and Education Provided/Documented   [] Identified and Pt declined/was not appropriate     Cultureal, Orthodoxy, OR Ethnic Dietary Needs:    [x] None Identified   [] Identified and Addressed     [x] Interdisciplinary Care Plan Reviewed/Documented    [x] Discharge Planning: General healthy diet with ONS prn to meet nutrition needs      MONITORING /EVALUATION:      Food/Nutrient Intake Outcomes:  Total energy intake  Physical Signs/Symptoms Outcomes: Weight/weight change, Electrolyte and renal profile, GI    NUTRITION RISK:    [x] High      to        [x] Moderate           []  Low  []  Minimal/Uncompromised    PT SEEN FOR:    []  MD Consult: []Calorie Count      []Diabetic Diet Education        []Diet Education     []Electrolyte Management     []General Nutrition Management and Supplements     []Management of Tube Feeding     []TPN Recommendations    [x]  RN Referral:  [x]MST score >=2     []Enteral/Parenteral Nutrition PTA     []Pregnant: Gestational DM or Multigestation     []Pressure Ulcer/Wound Care needs        []  Low BMI  []  DTR Referral       Durell Halsted, RDN  Pager [phone]  Weekend Pager [phone]

## 2017-05-29 NOTE — PROGRESS NOTES
Problem: Mobility Impaired (Adult and Pediatric)  Goal: *Acute Goals and Plan of Care (Insert Text)  Physical Therapy Goals  Initiated 5/29/2017  1. Patient will move from supine to sit and sit to supine , scoot up and down and roll side to side in bed with modified independence within 7 day(s). 2. Patient will transfer from bed to chair and chair to bed with modified independence using the least restrictive device within 7 day(s). 3. Patient will perform sit to stand with modified independence within 7 day(s). 4. Patient will ambulate with supervision/set-up for 150 feet with the least restrictive device within 7 day(s). .  PHYSICAL THERAPY EVALUATION  SEEN 1000 TO 1030  Patient: Matilde Morrison (57 y.o. female)  Date: 5/29/2017  Primary Diagnosis: UTI (urinary tract infection)        Precautions:   Fall      ASSESSMENT :  Based on the objective data described below, the patient presents with decreased strength, balance, functional mobility and safety awareness as well as significant confusion. Pt oriented to person only. She needed min assist for bed mobility, transfers and to ambulate 20', to toilet and back to chair. She used a r/w to ambulate, but needed vc's to manage the walker. Pt very fatigued after walking and c/o back pain throughout. Daughter present at the end of session. PTA, pt was living in [place] [address] at [facility]. Will assess d/c needs as POC continues. Patient will benefit from skilled intervention to address the above impairments.   Patients rehabilitation potential is considered to be Fair  Factors which may influence rehabilitation potential include:   [ ]         None noted  [X]         Mental ability/status  [X]         Medical condition  [ ]         Home/family situation and support systems  [X]         Safety awareness  [ ]         Pain tolerance/management  [ ]         Other:        PLAN :  Recommendations and Planned Interventions:  [X]           Bed Mobility Training             [ ]    Neuromuscular Re-Education  [X]           Transfer Training                   [ ]    Orthotic/Prosthetic Training  [X]           Gait Training                         [ ]    Modalities  [X]           Therapeutic Exercises           [ ]    Edema Management/Control  [X]           Therapeutic Activities            [X]    Patient and Family Training/Education  [ ]           Other (comment):     Frequency/Duration: Patient will be followed by physical therapy  5 times a week to address goals. Discharge Recommendations: [facility]  Further Equipment Recommendations for Discharge: TBD       SUBJECTIVE:   Patient stated I am so tired.       OBJECTIVE DATA SUMMARY:   HISTORY:    Past Medical History:   Diagnosis Date    Arthritis       hands    Hypertension      Incontinence of urine      Other ill-defined conditions       freq UTIs    Thromboembolus ([facility] [address] 5-2011     left leg    Unspecified adverse effect of anesthesia       PONV     Past Surgical History:   Procedure Laterality Date    BREAST SURGERY PROCEDURE UNLISTED         right breast bx x2    HX CATARACT REMOVAL   2008     zoe    HX CHOLECYSTECTOMY        HX UROLOGICAL   2006     bladder sling    VASCULAR SURGERY PROCEDURE UNLIST   6-2011     left leg vein procedure DVT     Prior Level of Function/Home Situation: Independent Living  Personal factors and/or comorbidities impacting plan of care:      Home Situation  Home Environment: Independent living  [address] [place] [address] Name: Preston  One/Two Story Residence: One story  Living Alone: Yes  Support Systems: Child(jm), Family member(s)  Patient Expects to be Discharged to[de-identified] Assisted living  Current DME Used/Available at Home: bibiana Resendiz     EXAMINATION/PRESENTATION/DECISION MAKING:   Critical Behavior:  Neurologic State: Alert, Confused  Orientation Level: Oriented to person, Disoriented to place  Cognition: Decreased command following, Poor safety awareness, Impaired decision making, Memory loss  Safety/Judgement: Awareness of environment, Decreased insight into deficits, Decreased awareness of need for safety, Decreased awareness of need for assistance  Hearing: Auditory  Auditory Impairment: None  Range Of Motion:  AROM: Generally decreased, functional  Strength:    Strength: Generally decreased, functional  Vision:   Acuity: Within Defined Limits (grossly)  Corrective Lenses: Glasses (bifocals)  Functional Mobility:  Bed Mobility:  Rolling: Supervision  Supine to Sit: Supervision  Transfers:  Sit to Stand: Minimum assistance  Stand to Sit: Minimum assistance  Balance:   Sitting: Intact  Standing: Impaired  Standing - Static: Fair  Standing - Dynamic : Fair  Ambulation/Gait Training:  Distance (ft): 20 Feet (ft)  Assistive Device: Walker, rolling  Ambulation - Level of Assistance: Minimal assistance  Gait Abnormalities: Decreased step clearance  Base of Support: Narrowed  Speed/Jie: Pace decreased (<100 feet/min)  Step Length: Left shortened;Right shortened        Functional Measure:  Barthel Index:      Bathin  Bladder: 0  Bowels: 5  Groomin  Dressin  Feedin  Mobility: 0  Stairs: 0  Toilet Use: 5  Transfer (Bed to Chair and Back): 10  Total: 30         Barthel and G-code impairment scale:  Percentage of impairment CH  0% CI  1-19% CJ  20-39% CK  40-59% CL  60-79% CM  80-99% CN  100%   Barthel Score 0-100 100 99-80 79-60 59-40 20-39 1-19    0   Barthel Score 0-20 20 17-19 13-16 9-12 5-8 1-4 0      The Barthel ADL Index: Guidelines  1. The index should be used as a record of what a patient does, not as a record of what a patient could do. 2. The main aim is to establish degree of independence from any help, physical or verbal, however minor and for whatever reason. 3. The need for supervision renders the patient not independent. 4. A patient's performance should be established using the best available evidence.  Asking the patient, friends/relatives and nurses are the usual sources, but direct observation and common sense are also important. However direct testing is not needed. 5. Usually the patient's performance over the preceding 24-48 hours is important, but occasionally longer periods will be relevant. 6. Middle categories imply that the patient supplies over 50 per cent of the effort. 7. Use of aids to be independent is allowed. Monserrat Tejada, Barthel, D.W. (2756). Functional evaluation: the Barthel Index. [address] [facility] (14)2. Eloisa Block candy EMETERIO WhelanF, Lotus Quiroga., Hanna Vázquez., Jose Maria, [address] (1999). Measuring the change indisability after inpatient rehabilitation; comparison of the responsiveness of the Barthel Index and Functional [place] Measure. Journal of Neurology, Neurosurgery, and Psychiatry, 66(4), 539-775. Ansley Don, NEricJ.A, HARI Nair, & Yissel Oconnor, M.A. (2004.) Assessment of post-stroke quality of life in cost-effectiveness studies: The usefulness of the Barthel Index and the EuroQoL-5D. Quality of Life Research, 13, 137-41         G codes: In compliance with CMSs Claims Based Outcome Reporting, the following G-code set was chosen for this patient based on their primary functional limitation being treated: The outcome measure chosen to determine the severity of the functional limitation was the Barthel with a score of 30/100 which was correlated with the impairment scale.       · Mobility - Walking and Moving Around:               - CURRENT STATUS:    CL - 60%-79% impaired, limited or restricted               - GOAL STATUS:           CJ - 20%-39% impaired, limited or restricted               - D/C STATUS:                       ---------------To be determined---------------      Physical Therapy Evaluation Charge Determination   History Examination Presentation Decision-Making   LOW Complexity : Zero comorbidities / personal factors that will impact the outcome / POC LOW Complexity : 1-2 Standardized tests and measures addressing body structure, function, activity limitation and / or participation in recreation  LOW Complexity : Stable, uncomplicated  LOW Complexity : FOTO score of       Based on the above components, the patient evaluation is determined to be of the following complexity level: LOW   Activity Tolerance: Tolerated fair, fatigued quickly  Please refer to the flowsheet for vital signs taken during this treatment. After treatment:   [X]         Patient left in no apparent distress sitting up in chair  [ ]         Patient left in no apparent distress in bed  [X]         Call bell left within reach  [X]         Nursing notified  [X]         Caregiver present  [X]         Bed alarm activated      COMMUNICATION/EDUCATION:   The patients plan of care was discussed with: Registered Nurse.  [X]         Fall prevention education was provided and the patient/caregiver indicated understanding. [X]         Patient/family have participated as able in goal setting and plan of care. [X]         Patient/family agree to work toward stated goals and plan of care. [ ]         Patient understands intent and goals of therapy, but is neutral about his/her participation. [ ]         Patient is unable to participate in goal setting and plan of care.      Thank you for this referral.  Abdi Martinez, PT

## 2017-05-30 LAB
ANION GAP BLD CALC-SCNC: 9 MMOL/L (ref 5–15)
BACTERIA SPEC CULT: ABNORMAL
BACTERIA SPEC CULT: ABNORMAL
BUN SERPL-MCNC: 22 MG/DL (ref 6–20)
BUN/CREAT SERPL: 19 (ref 12–20)
CALCIUM SERPL-MCNC: 7.5 MG/DL (ref 8.5–10.1)
CHLORIDE SERPL-SCNC: 109 MMOL/L (ref 97–108)
CO2 SERPL-SCNC: 25 MMOL/L (ref 21–32)
CREAT SERPL-MCNC: 1.15 MG/DL (ref 0.55–1.02)
ERYTHROCYTE [DISTWIDTH] IN BLOOD BY AUTOMATED COUNT: 15.4 % (ref 11.5–14.5)
GLUCOSE SERPL-MCNC: 123 MG/DL (ref 65–100)
HCT VFR BLD AUTO: 44.2 % (ref 35–47)
HGB BLD-MCNC: 15 G/DL (ref 11.5–16)
MCH RBC QN AUTO: 28.8 PG (ref 26–34)
MCHC RBC AUTO-ENTMCNC: 33.9 G/DL (ref 30–36.5)
MCV RBC AUTO: 85 FL (ref 80–99)
PLATELET # BLD AUTO: 131 K/UL (ref 150–400)
POTASSIUM SERPL-SCNC: 2.8 MMOL/L (ref 3.5–5.1)
RBC # BLD AUTO: 5.2 M/UL (ref 3.8–5.2)
SERVICE CMNT-IMP: ABNORMAL
SODIUM SERPL-SCNC: 143 MMOL/L (ref 136–145)
WBC # BLD AUTO: 9.6 K/UL (ref 3.6–11)

## 2017-05-30 PROCEDURE — 87040 BLOOD CULTURE FOR BACTERIA: CPT | Performed by: INTERNAL MEDICINE

## 2017-05-30 PROCEDURE — 74011250637 HC RX REV CODE- 250/637: Performed by: INTERNAL MEDICINE

## 2017-05-30 PROCEDURE — 85027 COMPLETE CBC AUTOMATED: CPT | Performed by: HOSPITALIST

## 2017-05-30 PROCEDURE — 65270000029 HC RM PRIVATE

## 2017-05-30 PROCEDURE — 36415 COLL VENOUS BLD VENIPUNCTURE: CPT | Performed by: INTERNAL MEDICINE

## 2017-05-30 PROCEDURE — 74011000258 HC RX REV CODE- 258: Performed by: INTERNAL MEDICINE

## 2017-05-30 PROCEDURE — 97535 SELF CARE MNGMENT TRAINING: CPT

## 2017-05-30 PROCEDURE — 74011250636 HC RX REV CODE- 250/636: Performed by: EMERGENCY MEDICINE

## 2017-05-30 PROCEDURE — 74011250636 HC RX REV CODE- 250/636: Performed by: INTERNAL MEDICINE

## 2017-05-30 PROCEDURE — 97116 GAIT TRAINING THERAPY: CPT

## 2017-05-30 PROCEDURE — 80048 BASIC METABOLIC PNL TOTAL CA: CPT | Performed by: HOSPITALIST

## 2017-05-30 RX ADMIN — SODIUM CHLORIDE 75 ML/HR: 900 INJECTION, SOLUTION INTRAVENOUS at 04:06

## 2017-05-30 RX ADMIN — HYDROCODONE BITARTRATE AND ACETAMINOPHEN 1 TABLET: 5; 325 TABLET ORAL at 20:17

## 2017-05-30 RX ADMIN — DONEPEZIL HYDROCHLORIDE 10 MG: 5 TABLET, FILM COATED ORAL at 22:08

## 2017-05-30 RX ADMIN — CEFTRIAXONE 1 G: 1 INJECTION, POWDER, FOR SOLUTION INTRAMUSCULAR; INTRAVENOUS at 22:14

## 2017-05-30 RX ADMIN — AMLODIPINE BESYLATE 5 MG: 5 TABLET ORAL at 08:23

## 2017-05-30 RX ADMIN — HYDROCODONE BITARTRATE AND ACETAMINOPHEN 1 TABLET: 5; 325 TABLET ORAL at 15:44

## 2017-05-30 RX ADMIN — ACETAMINOPHEN 650 MG: 325 TABLET, FILM COATED ORAL at 14:52

## 2017-05-30 RX ADMIN — Medication 10 ML: at 15:45

## 2017-05-30 RX ADMIN — Medication 10 ML: at 04:06

## 2017-05-30 RX ADMIN — MEMANTINE 5 MG: 10 TABLET ORAL at 08:23

## 2017-05-30 RX ADMIN — ASPIRIN 81 MG: 81 TABLET, COATED ORAL at 08:23

## 2017-05-30 RX ADMIN — ESCITALOPRAM OXALATE 10 MG: 10 TABLET ORAL at 09:00

## 2017-05-30 NOTE — PROGRESS NOTES
Hospitalist Progress Note    NAME: Екатерина Quezada   :  1935   MRN:  [id number]       Interim Hospital Summary: [de-identified] y.o. female whom presented on 2017 with      Assessment / Plan:  NEIL secondary to dehydration with underlying dementia  Encephalopathy with above  Gram neg bacteremia probably secondary to UTI  Leucocytosis ( no sepsis)    Cont iv hydration, repeat labs today  Ceftriaxone  Blood  Cultures on  with gram neg rods, normally would not repeat with gram neg bacteremia  Repeat cultures  On  , per radiology wanted to be neg before kyphoplasty  Urine cultures with   CT head neg >100, 000 colonies ( contamination as well)    Acute L5 compression fracture  MRI: Moderate L5 compression fracture, relatively acute. No associated spinal  canal compromise  Pain management  Plan for kyphoplasty in am  Needs pt/ot    Dementia  Living in [facility] independent living  Needs assisted living, needing more care recently per the daughter  Cont namenda      Body mass index is 23.76 kg/(m^2). Code status: dnr  Prophylaxis: lovenox  Recommended Disposition:tbd     Subjective:     Chief Complaint / Reason for Physician Visit  Follow up on dementia, compression fracture and uti    Pleasant, pain controlled  Repeat cultures pending  No labs today  Discussed with RN events overnight. Review of Systems:  Symptom Y/N Comments  Symptom Y/N Comments   Fever/Chills    Chest Pain     Poor Appetite    Edema     Cough    Abdominal Pain     Sputum    Joint Pain     SOB/MOTT    Pruritis/Rash     Nausea/vomit    Tolerating PT/OT     Diarrhea    Tolerating Diet     Constipation    Other       Could NOT obtain due to:      Objective:     VITALS:   Last 24hrs VS reviewed since prior progress note.  Most recent are:  Patient Vitals for the past 24 hrs:   Temp Pulse Resp BP SpO2   17 0719 98.4 °F (36.9 °C) 64 18 139/58 94 %   17 0404 98.5 °F (36.9 °C) 72 18 144/87 94 %   17 2318 98.9 °F (37.2 °C) 65 16 147/76 94 %   05/29/17 1947 98.1 °F (36.7 °C) (!) 59 18 139/70 94 %   05/29/17 1533 98.1 °F (36.7 °C) (!) 55 18 123/53 95 %   05/29/17 1129 97.4 °F (36.3 °C) 63 18 124/56 93 %   05/29/17 1019 - 68 - 135/56 -   05/29/17 1009 - 62 - 126/64 -     No intake or output data in the 24 hours ending 05/30/17 0932     PHYSICAL EXAM:  General: WD, WN. Alert, cooperative, no acute distress    EENT:  EOMI. Anicteric sclerae. MMM  Resp:  CTA bilaterally, no wheezing or rales. No accessory muscle use  CV:  Regular  rhythm,  No edema  GI:  Soft, Non distended, Non tender.  +Bowel sounds  Neurologic:  Alert and oriented X 3, normal speech,   Psych:   Good insight. Not anxious nor agitated  Skin:  No rashes. No jaundice    Reviewed most current lab test results and cultures  YES  Reviewed most current radiology test results   YES  Review and summation of old records today    NO  Reviewed patient's current orders and MAR    YES  PMH/ reviewed - no change compared to H&P  ________________________________________________________________________  Care Plan discussed with:    Comments   Patient     Family  y    RN y    Care Manager     Consultant                        Multidiciplinary team rounds were held today with , nursing, pharmacist and clinical coordinator. Patient's plan of care was discussed; medications were reviewed and discharge planning was addressed. ________________________________________________________________________  Total NON critical care TIME:  35   Minutes    Total CRITICAL CARE TIME Spent:   Minutes non procedure based      Comments   >50% of visit spent in counseling and coordination of care     ________________________________________________________________________  Walker Morales MD     Procedures: see electronic medical records for all procedures/Xrays and details which were not copied into this note but were reviewed prior to creation of Plan.       LABS:  I reviewed today's most current labs and imaging studies.   Pertinent labs include:  Recent Labs      05/28/17 0323 05/27/17   1448   WBC  13.6*  24.3*   HGB  15.5  15.5   HCT  45.7  44.1   PLT  129*  150     Recent Labs      05/28/17 0323 05/27/17   1448   NA  139  138   K  2.9*  3.2*   CL  103  103   CO2  25  25   GLU  77  93   BUN  59*  68*   CREA  1.54*  1.96*   CA  8.3*  8.6   ALB   --   2.4*   TBILI   --   0.7   SGOT   --   42*   ALT   --   31       Signed: Chaka Yun MD

## 2017-05-30 NOTE — PROGRESS NOTES
Pt is an [de-identified] y/o  female admitted with UTI. Per pt daughter Rhys Richards who stated she was the mPOA, stated pt lives in independent living at [facility] but needs assisted living. Daughter plans to work on transitioning pt and meeting with staff at [facility]. Pt has access to a rolling walker and rollator for ambulation. Per daughter she would like for pt to go to SNF at discharge at [facility] at discharge. Referral sent via [address] [place] [address] for their review. CM will continue to follow and assist with additional discharge needs. Care Management Interventions  PCP Verified by CM: Yes  Mode of Transport at Discharge: Other (see comment) (Pt may need transport at discharge, TBD closer to discharge)  Transition of Care Consult (CM Consult): SNF ([facility] )  Partner SNF: Yes  Physical Therapy Consult: Yes  Occupational Therapy Consult: Yes  Current Support Network:  Other (Pt lives in independent living at [facility])  Confirm Follow Up Transport: Family (Pt family assists with transportation to her appointments)  Plan discussed with Pt/Family/Caregiver: Yes  Freedom of Choice Offered: Yes  Discharge Location  Discharge Placement: Skilled nursing facility ([facility])    AMAN Neely  Ext [phone]

## 2017-05-30 NOTE — ROUTINE PROCESS
Bedside shift change report given to Jackson Jean (oncoming nurse) by Becky Lancaster (offgoing nurse). Report included the following information SBAR, Kardex, Intake/Output, MAR and Recent Results.     Zone Phone: [phone]        Significant changes during shift: none. Blood cultures drawn. Pt with no c/o pain.            Patient Information     Mahendra Clark  [de-identified] y.o.  5/27/2017 2:01 PM by Maria C Rooney MD. Mahendra Clark was admitted from [address] [place] [address] [address]  Problem List           Patient Active Problem List     Diagnosis Date Noted    Altered mental status 05/28/2017       Class: Acute    Acute renal injury ([facility] [address] 05/28/2017       Class: Acute    Fall at home 05/28/2017       Class: Present on Admission    Elevated troponin 05/28/2017       Class: Present on Admission    Closed compression fracture of lumbosacral spine ([address] 05/28/2017       Class: Present on Admission    UTI (urinary tract infection) 05/27/2017    Depression 01/13/2017    Diarrhea 01/13/2017    HTN (hypertension) 11/30/2015    Dementia 11/30/2015    Mixed incontinence urge and stress 08/17/2011           Past Medical History:   Diagnosis Date    Arthritis       hands    Hypertension      Incontinence of urine      Other ill-defined conditions       freq UTIs    Thromboembolus ([facility] [address] 5-2011     left leg    Unspecified adverse effect of anesthesia       PONV            Core Measures:     CVA: No No  CHF:No No  PNA:No No     Activity Status:     OOB to Chair YEs  Ambulated this shift No   Bed Rest No     Supplemental O2: (If Applicable)     NC No  NRB No  Venti-mask No        LINES AND DRAINS:     Central Line? No      PICC LINE? No      Urinary Catheter? No      DVT prophylaxis:     DVT prophylaxis Med- Yes  DVT prophylaxis SCD or FIDENCIO- No      Wounds: (If Applicable)     Wounds- No        Patient Safety:     Falls Score Total Score: 5  Safety Level_______  Bed Alarm On? Yes  Sitter?  No     Plan for upcoming shift: IR consult           Discharge Plan: none     Active Consults:  IP CONSULT TO INTERVENTIONAL RADIOLOGY

## 2017-05-30 NOTE — PROGRESS NOTES
Problem: Mobility Impaired (Adult and Pediatric)  Goal: *Acute Goals and Plan of Care (Insert Text)  Physical Therapy Goals  Initiated 5/29/2017  1. Patient will move from supine to sit and sit to supine , scoot up and down and roll side to side in bed with modified independence within 7 day(s). 2. Patient will transfer from bed to chair and chair to bed with modified independence using the least restrictive device within 7 day(s). 3. Patient will perform sit to stand with modified independence within 7 day(s). 4. Patient will ambulate with supervision/set-up for 150 feet with the least restrictive device within 7 day(s). .   PHYSICAL THERAPY TREATMENT  Patient: Grey Johnson (47 y.o. female)  Date: 5/30/2017  Diagnosis: UTI (urinary tract infection) Sepsis, Gram negative ([facility] [address]       Precautions: Fall      ASSESSMENT:  Pt was received in supine and cleared by nursing to mobilize. Pt is confused but agreeable to working with therapy. Educated on log rolling, which was performed with overall mod A to come to EOB. Sit<>stand was performed with CGA and elevated height of the bed to come to full stand. She was given safety cues for hand placement but did not follow. Ambulated within the room for 15ft with rollator and min A to help manage safely. OT worked on toileting and hygiene. She reported feeling dizzy while standing for agnieszka hygiene. Recliner was brought to door of bathroom and she was placed in chair with feet reclined. Planned to have a kyphoplasty 5/31. Recommending SNF. Progression toward goals:  [ ]    Improving appropriately and progressing toward goals  [X]    Improving slowly and progressing toward goals  [ ]    Not making progress toward goals and plan of care will be adjusted       PLAN:  Patient continues to benefit from skilled intervention to address the above impairments. Continue treatment per established plan of care.   Discharge Recommendations:  Armani Sarmiento Equipment Recommendations for Discharge:  TBD       SUBJECTIVE:   Patient stated My back hurts.       OBJECTIVE DATA SUMMARY:   Critical Behavior:  Neurologic State: Alert  Orientation Level: Oriented to place, Oriented to person  Cognition: Follows commands  Safety/Judgement: Awareness of environment, Decreased insight into deficits, Decreased awareness of need for safety, Decreased awareness of need for assistance  Functional Mobility Training:  Bed Mobility:  Rolling: Contact guard assistance  Supine to Sit: Moderate assistance;Assist x2              Transfers:  Sit to Stand: Contact guard assistance (height of bed elevated)  Stand to Sit: Contact guard assistance                             Balance:  Sitting: Intact  Standing: Impaired  Standing - Static: Fair;Constant support  Standing - Dynamic : Fair  Ambulation/Gait Training:  Distance (ft): 15 Feet (ft)  Assistive Device: Walker, rollator  Ambulation - Level of Assistance: Minimal assistance        Gait Abnormalities: Decreased step clearance;Shuffling gait        Base of Support: Narrowed     Speed/Jie: Pace decreased (<100 feet/min)  Step Length: Left shortened;Right shortened              Therapeutic Exercises: Ankle pumps and marches while on the toilet (pt reported feeling dizzy after BM)  Pain:  Pain Scale 1: Numeric (0 - 10)  Pain Intensity 1: 0              Activity Tolerance:   Fair, painful   Please refer to the flowsheet for vital signs taken during this treatment.   After treatment:   [X]    Patient left in no apparent distress sitting up in chair  [ ]    Patient left in no apparent distress in bed  [X]    Call bell left within reach  [X]    Nursing notified  [ ]    Caregiver present  [X]    Bed alarm activated      COMMUNICATION/COLLABORATION:   The patients plan of care was discussed with: Occupational Therapist and Registered Nurse     Kimberly Trevino, PT, DPT   Time Calculation: 26 mins

## 2017-05-30 NOTE — PROGRESS NOTES
6638: Spoke with mitul in radiology about procedure. Reported blood cultures came back negative. Will hopefully schedule procedure for tomorrow    1738:  Attempted to call report to transfer patient     [address]: Patient transferred to [facility] surg

## 2017-05-30 NOTE — PROGRESS NOTES
TRANSFER - OUT REPORT:    Verbal report given to [facility] (name) on Abbi Burnett  being transferred to [facility]) for change in patient condition(Patient no longer on  telemetry)       Report consisted of patients Situation, Background, Assessment and   Recommendations(SBAR). Information from the following report(s) SBAR, Intake/Output, MAR and Recent Results was reviewed with the receiving nurse. Lines:   Peripheral IV 05/30/17 Right Forearm (Active)   Site Assessment Clean;Clean, dry, & intact 5/30/2017  2:51 PM   Phlebitis Assessment 0 5/30/2017  2:51 PM   Infiltration Assessment 0 5/30/2017  2:51 PM   Dressing Status Clean, dry, & intact 5/30/2017  2:51 PM   Dressing Type Tape;Transparent 5/30/2017  2:51 PM        Opportunity for questions and clarification was provided.       Patient transported with:   Registered Nurse

## 2017-05-30 NOTE — PROGRESS NOTES
Attempted to see pt for PT tx. Currently family at bedside about to feed pt and asked for therapy to return later. Will follow up later as able. Thank you.   Tayla Varela, PT, DPT

## 2017-05-30 NOTE — PROGRESS NOTES
Attended interdisciplinary rounds on patient floor where patient care was discussed. Daiana Dennis M.S.   Spiritual Care Department  If needs arise please call Betty-GALINA [phone])

## 2017-05-30 NOTE — PROGRESS NOTES
Problem: Self Care Deficits Care Plan (Adult)  Goal: *Acute Goals and Plan of Care (Insert Text)  Occupational Therapy Goals  Initiated 5/29/2017  1. Patient will perform grooming while standing at the sink with supervision within 7 day(s). 2. Patient will perform upper body dressing with supervision/set-up within 7 day(s). 3. Patient will perform lower body dressing with minimal assistance/contact guard assist within 7 day(s). 4. Patient will perform toilet transfers with supervision/set-up within 7 day(s). 5. Patient will perform all aspects of toileting with minimal assistance within 7 day(s). OCCUPATIONAL THERAPY TREATMENT  Patient: Taylor Guzman (8 y.o. female)  Date: 5/30/2017  Diagnosis: UTI (urinary tract infection) Sepsis, Gram negative ([facility] [address]       Precautions: Fall      ASSESSMENT:  Nursing cleared for therapy. Patient oriented to self and place with poor carry over of situation. Re-oriented to L5 compression fx. Provided verbal and tactile cues for back precautions and log rolling techniques. Completed supine <> sit with log rolling techniques needing mod A x 2. Amb to bathroom with CGA with rollator, transfer with CGA, and hygiene with mod A for full completion after BM. Poor safety with rollator brake use and sequencing for sit <> stand. Left up in chair. Patient with scheduled kypho 5/31/17. Recommend SNF. Progression toward goals:  [ ]       Improving appropriately and progressing toward goals  [X]       Improving slowly and progressing toward goals  [ ]       Not making progress toward goals and plan of care will be adjusted       PLAN:  Patient continues to benefit from skilled intervention to address the above impairments. Continue treatment per established plan of care. Discharge Recommendations:  Skilled Nursing Facility  Further Equipment Recommendations for Discharge:  TBD SNF       SUBJECTIVE:   Patient stated I guess I did have a fall.   Patient reports in regards to compression fx      OBJECTIVE DATA SUMMARY:   Cognitive/Behavioral Status:  Neurologic State: Alert  Orientation Level: Oriented to person;Oriented to place; Disoriented to situation  Cognition: Follows commands              Functional Mobility and Transfers for ADLs:  Bed Mobility:  Rolling: Contact guard assistance  Supine to Sit: Moderate assistance;Assist x2     Transfers:  Sit to Stand: Contact guard assistance (height of bed elevated)  Functional Transfers  Bathroom Mobility: Contact guard assistance  Toilet Transfer : Contact guard assistance     Balance:  Sitting: Intact  Standing: Impaired  Standing - Static: Fair;Constant support  Standing - Dynamic : Fair     ADL Intervention:  Provided verbal and tactile cues for back precautions and log rolling techniques. Completed supine <> sit with log rolling techniques needing mod A x 2. Demonstrated good sitting balance at EOB. Reports mild LBP in static sitting, agreeable for toiletnig. Amb to bathroom with CGA with rollator with frequent verbal cues for sequencing and safety with brake use. Completed toileting transfer with CGA with use of grab bar. Attempted to complete hygiene in sitting on commode, unable. In standing completed hygiene for BM with mod A. Reports of dizziness follow BM, most likely secondary to strain with BM. Brought recliner closer to bathroom doorway and completed transfer at rollator level with CGA and verbal cues for sequencing. Toileting  Bowel Hygiene: Moderate assistance; Compensatory technique training        Pain:  Pain Scale 1: Numeric (0 - 10)  Pain Intensity 1: 0      Activity Tolerance:   Reports of dizziness with standing following toileting, most likely secondary to straining with BM. Returned to sitting on commode with BLE AROM and resolution in sitting. Unable to get BP at time of reported dizziness, unable to leave patient safely.        After treatment:   [X] Patient left in no apparent distress sitting up in chair  [ ] Patient left in no apparent distress in bed  [X] Call bell left within reach  [X] Nursing notified  [ ] Caregiver present  [X] Bed alarm activated      COMMUNICATION/COLLABORATION:   The patients plan of care was discussed with: Physical Therapist, Registered Nurse and Patient     Diomedes Stewart OT  Time Calculation: 24 mins

## 2017-05-31 ENCOUNTER — APPOINTMENT (OUTPATIENT)
Dept: INTERVENTIONAL RADIOLOGY/VASCULAR | Age: 82
DRG: 853 | End: 2017-05-31
Attending: HOSPITALIST
Payer: MEDICARE

## 2017-05-31 PROCEDURE — 99152 MOD SED SAME PHYS/QHP 5/>YRS: CPT

## 2017-05-31 PROCEDURE — 74011250636 HC RX REV CODE- 250/636: Performed by: INTERNAL MEDICINE

## 2017-05-31 PROCEDURE — C1713 ANCHOR/SCREW BN/BN,TIS/BN: HCPCS

## 2017-05-31 PROCEDURE — 77030021782 HC SYS CEM CART DEL KYPH -C

## 2017-05-31 PROCEDURE — 74011636320 HC RX REV CODE- 636/320

## 2017-05-31 PROCEDURE — 0QU03JZ SUPPLEMENT LUMBAR VERTEBRA WITH SYNTHETIC SUBSTITUTE, PERCUTANEOUS APPROACH: ICD-10-PCS | Performed by: RADIOLOGY

## 2017-05-31 PROCEDURE — 77030021783 HC SYS CEM DEL MEDT -D

## 2017-05-31 PROCEDURE — 77030003666 HC NDL SPINAL BD -A

## 2017-05-31 PROCEDURE — 65270000029 HC RM PRIVATE

## 2017-05-31 PROCEDURE — 74011250636 HC RX REV CODE- 250/636: Performed by: HOSPITALIST

## 2017-05-31 PROCEDURE — 74011250636 HC RX REV CODE- 250/636: Performed by: RADIOLOGY

## 2017-05-31 PROCEDURE — 77010033678 HC OXYGEN DAILY

## 2017-05-31 PROCEDURE — 77030034842 HC TAMP SPN BN INFL EXP II KYPH -I

## 2017-05-31 PROCEDURE — 0QS03ZZ REPOSITION LUMBAR VERTEBRA, PERCUTANEOUS APPROACH: ICD-10-PCS | Performed by: RADIOLOGY

## 2017-05-31 PROCEDURE — 74011250637 HC RX REV CODE- 250/637: Performed by: INTERNAL MEDICINE

## 2017-05-31 PROCEDURE — 97116 GAIT TRAINING THERAPY: CPT

## 2017-05-31 PROCEDURE — 74011000258 HC RX REV CODE- 258: Performed by: INTERNAL MEDICINE

## 2017-05-31 PROCEDURE — 74011000250 HC RX REV CODE- 250: Performed by: RADIOLOGY

## 2017-05-31 RX ORDER — BUPIVACAINE HYDROCHLORIDE 5 MG/ML
3 INJECTION, SOLUTION EPIDURAL; INTRACAUDAL ONCE
Status: COMPLETED | OUTPATIENT
Start: 2017-05-31 | End: 2017-05-31

## 2017-05-31 RX ORDER — ONDANSETRON 2 MG/ML
4 INJECTION INTRAMUSCULAR; INTRAVENOUS ONCE
Status: COMPLETED | OUTPATIENT
Start: 2017-05-31 | End: 2017-05-31

## 2017-05-31 RX ORDER — POTASSIUM CHLORIDE 7.45 MG/ML
10 INJECTION INTRAVENOUS
Status: DISPENSED | OUTPATIENT
Start: 2017-05-31 | End: 2017-05-31

## 2017-05-31 RX ORDER — SODIUM CHLORIDE 9 MG/ML
25 INJECTION, SOLUTION INTRAVENOUS CONTINUOUS
Status: DISCONTINUED | OUTPATIENT
Start: 2017-05-31 | End: 2017-05-31

## 2017-05-31 RX ORDER — LIDOCAINE HYDROCHLORIDE 20 MG/ML
20 INJECTION, SOLUTION INFILTRATION; PERINEURAL ONCE
Status: COMPLETED | OUTPATIENT
Start: 2017-05-31 | End: 2017-05-31

## 2017-05-31 RX ORDER — DIPHENHYDRAMINE HYDROCHLORIDE 50 MG/ML
25 INJECTION, SOLUTION INTRAMUSCULAR; INTRAVENOUS ONCE
Status: COMPLETED | OUTPATIENT
Start: 2017-05-31 | End: 2017-05-31

## 2017-05-31 RX ORDER — CEFAZOLIN SODIUM IN 0.9 % NACL 2 G/100 ML
2 PLASTIC BAG, INJECTION (ML) INTRAVENOUS ONCE
Status: COMPLETED | OUTPATIENT
Start: 2017-05-31 | End: 2017-05-31

## 2017-05-31 RX ORDER — MIDAZOLAM HYDROCHLORIDE 1 MG/ML
5 INJECTION, SOLUTION INTRAMUSCULAR; INTRAVENOUS
Status: DISCONTINUED | OUTPATIENT
Start: 2017-05-31 | End: 2017-05-31

## 2017-05-31 RX ORDER — KETOROLAC TROMETHAMINE 30 MG/ML
15 INJECTION, SOLUTION INTRAMUSCULAR; INTRAVENOUS ONCE
Status: COMPLETED | OUTPATIENT
Start: 2017-05-31 | End: 2017-05-31

## 2017-05-31 RX ORDER — FENTANYL CITRATE 50 UG/ML
100 INJECTION, SOLUTION INTRAMUSCULAR; INTRAVENOUS
Status: DISCONTINUED | OUTPATIENT
Start: 2017-05-31 | End: 2017-05-31

## 2017-05-31 RX ADMIN — MIDAZOLAM HYDROCHLORIDE 1 MG: 1 INJECTION INTRAMUSCULAR; INTRAVENOUS at 15:55

## 2017-05-31 RX ADMIN — CEFTRIAXONE 1 G: 1 INJECTION, POWDER, FOR SOLUTION INTRAMUSCULAR; INTRAVENOUS at 22:23

## 2017-05-31 RX ADMIN — ONDANSETRON HYDROCHLORIDE 4 MG: 2 INJECTION, SOLUTION INTRAMUSCULAR; INTRAVENOUS at 14:57

## 2017-05-31 RX ADMIN — Medication 10 ML: at 00:16

## 2017-05-31 RX ADMIN — FENTANYL CITRATE 25 MCG: 50 INJECTION, SOLUTION INTRAMUSCULAR; INTRAVENOUS at 15:14

## 2017-05-31 RX ADMIN — CEFAZOLIN 2 G: 10 INJECTION, POWDER, FOR SOLUTION INTRAVENOUS; PARENTERAL at 14:52

## 2017-05-31 RX ADMIN — Medication 10 ML: at 22:30

## 2017-05-31 RX ADMIN — MEMANTINE 5 MG: 10 TABLET ORAL at 09:43

## 2017-05-31 RX ADMIN — POTASSIUM CHLORIDE 10 MEQ: 10 INJECTION, SOLUTION INTRAVENOUS at 10:00

## 2017-05-31 RX ADMIN — KETOROLAC TROMETHAMINE 15 MG: 30 INJECTION, SOLUTION INTRAMUSCULAR at 14:58

## 2017-05-31 RX ADMIN — MIDAZOLAM HYDROCHLORIDE 1 MG: 1 INJECTION INTRAMUSCULAR; INTRAVENOUS at 15:20

## 2017-05-31 RX ADMIN — MIDAZOLAM HYDROCHLORIDE 1 MG: 1 INJECTION INTRAMUSCULAR; INTRAVENOUS at 15:14

## 2017-05-31 RX ADMIN — FENTANYL CITRATE 25 MCG: 50 INJECTION, SOLUTION INTRAMUSCULAR; INTRAVENOUS at 15:47

## 2017-05-31 RX ADMIN — MIDAZOLAM HYDROCHLORIDE 1 MG: 1 INJECTION INTRAMUSCULAR; INTRAVENOUS at 15:47

## 2017-05-31 RX ADMIN — FENTANYL CITRATE 25 MCG: 50 INJECTION, SOLUTION INTRAMUSCULAR; INTRAVENOUS at 15:28

## 2017-05-31 RX ADMIN — IOPAMIDOL 20 ML: 408 INJECTION, SOLUTION INTRATHECAL at 16:08

## 2017-05-31 RX ADMIN — FENTANYL CITRATE 25 MCG: 50 INJECTION, SOLUTION INTRAMUSCULAR; INTRAVENOUS at 15:19

## 2017-05-31 RX ADMIN — DIPHENHYDRAMINE HYDROCHLORIDE 25 MG: 50 INJECTION, SOLUTION INTRAMUSCULAR; INTRAVENOUS at 14:59

## 2017-05-31 RX ADMIN — POTASSIUM CHLORIDE 10 MEQ: 10 INJECTION, SOLUTION INTRAVENOUS at 09:42

## 2017-05-31 RX ADMIN — ESCITALOPRAM OXALATE 10 MG: 10 TABLET ORAL at 09:42

## 2017-05-31 RX ADMIN — POTASSIUM CHLORIDE 10 MEQ: 10 INJECTION, SOLUTION INTRAVENOUS at 11:00

## 2017-05-31 RX ADMIN — HYDROCODONE BITARTRATE AND ACETAMINOPHEN 1 TABLET: 5; 325 TABLET ORAL at 00:16

## 2017-05-31 RX ADMIN — AMLODIPINE BESYLATE 5 MG: 5 TABLET ORAL at 09:43

## 2017-05-31 RX ADMIN — SODIUM CHLORIDE 25 ML/HR: 900 INJECTION, SOLUTION INTRAVENOUS at 14:52

## 2017-05-31 RX ADMIN — SODIUM BICARBONATE 2 ML: 0.2 INJECTION, SOLUTION INTRAVENOUS at 16:07

## 2017-05-31 RX ADMIN — DONEPEZIL HYDROCHLORIDE 10 MG: 5 TABLET, FILM COATED ORAL at 22:32

## 2017-05-31 RX ADMIN — MIDAZOLAM HYDROCHLORIDE 1 MG: 1 INJECTION INTRAMUSCULAR; INTRAVENOUS at 15:28

## 2017-05-31 RX ADMIN — BUPIVACAINE HYDROCHLORIDE 10 ML: 5 INJECTION, SOLUTION EPIDURAL; INTRACAUDAL; PERINEURAL at 16:07

## 2017-05-31 RX ADMIN — ENOXAPARIN SODIUM 30 MG: 60 INJECTION SUBCUTANEOUS at 18:55

## 2017-05-31 RX ADMIN — LIDOCAINE HYDROCHLORIDE 400 MG: 20 INJECTION, SOLUTION INFILTRATION; PERINEURAL at 16:06

## 2017-05-31 NOTE — ROUTINE PROCESS
Name of procedure:Kyphoplasty L-5      Complications, if any, r/t procedure:None      Sedation medications given:Versed 5 mg and Fentanyl 10 mcg IV      Sedation tolerated: Yes      VS : Stable      Post Procedure Care Needed/order sets in [facility]care: Yes post kyphoplasty L-5      Any other specific needs to pt. Care:Pt is to remain on bedrest until 1808 and at that time she may get OOB and ambulate    TRANSFER - OUT REPORT:    Verbal report given to Ericka Raymond RN on [employer]  being transferred to room 2142 for routine progression of care       Report consisted of patients Situation, Background, Assessment and   Recommendations(SBAR). Information from the following report(s) Procedure Summary was reviewed with the receiving nurse. Opportunity for questions and clarification was provided.       Patient transported with:  O2 @ 2 lpm

## 2017-05-31 NOTE — H&P
Radiology History and Physical    Patient: Shaq Orellana [de-identified] y.o. female       Chief Complaint: Fall (Pt presents to ED for back pain post a fall x tuesday, L back pain) and Altered mental status (Dementia at baseline but more confusion )      History of Present Illness: L5 fracture     History:    Past Medical History:   Diagnosis Date    Arthritis     hands    Hypertension     Incontinence of urine     Other ill-defined conditions     freq UTIs    Thromboembolus ([address] 5-2011    left leg    Unspecified adverse effect of anesthesia     PONV     Family History   Problem Relation Age of Onset    Cancer Father      lung, prostate    Heart Disease Brother     No Known Problems Mother      Social History     Social History    Marital status:      Spouse name: N/A    Number of children: N/A    Years of education: N/A     Occupational History    Not on file. Social History Main Topics    Smoking status: Never Smoker    Smokeless tobacco: Never Used    Alcohol use No    Drug use: No    Sexual activity: No     Other Topics Concern    Not on file     Social History Narrative       Allergies:    Allergies   Allergen Reactions    Ciprofloxacin Itching    Ditropan [Oxybutynin Chloride] Itching    Lisinopril Itching    Sulfa (Sulfonamide Antibiotics) Itching    Toprol Xl [Metoprolol Succinate] Itching       Current Medications:  Current Facility-Administered Medications   Medication Dose Route Frequency    iopamidol (ISOVUE 300) 61 % contrast injection 15 mL  15 mL Other ONCE    aspirin delayed-release tablet 81 mg  81 mg Oral DAILY    enoxaparin (LOVENOX) injection 30 mg  30 mg SubCUTAneous Q24H    memantine (NAMENDA) tablet 5 mg  5 mg Oral DAILY    escitalopram oxalate (LEXAPRO) tablet 10 mg  10 mg Oral DAILY    amLODIPine (NORVASC) tablet 5 mg  5 mg Oral DAILY    donepezil (ARICEPT) tablet 10 mg  10 mg Oral QHS    sodium chloride (NS) flush 5-10 mL  5-10 mL IntraVENous Q8H    sodium chloride (NS) flush 5-10 mL  5-10 mL IntraVENous PRN    acetaminophen (TYLENOL) tablet 650 mg  650 mg Oral Q4H PRN    HYDROcodone-acetaminophen (NORCO) 5-325 mg per tablet 1 Tab  1 Tab Oral Q4H PRN    naloxone (NARCAN) injection 0.4 mg  0.4 mg IntraVENous PRN    cefTRIAXone (ROCEPHIN) 1 g in 0.9% sodium chloride (MBP/ADV) 50 mL  1 g IntraVENous Q24H        Physical Exam:  Blood pressure 123/55, pulse 86, temperature 97.7 °F (36.5 °C), resp. rate 18, height 5' 4\" (1.626 m), weight 65.8 kg (145 lb), SpO2 95 %. LUNG: clear to auscultation bilaterally, HEART: regular rate and rhythm, S1, S2 normal, no murmur, click, rub or gallop      Alerts:    Hospital Problems  Date Reviewed: 4/14/2017          Codes Class Noted POA    * (Principal)Sepsis, Gram negative ([facility] ICD-10-CM: A41.50  ICD-9-CM: 038.40, 995.91 Acute 5/29/2017 Yes        Altered mental status ICD-10-CM: R41.82  ICD-9-CM: 780.97 Acute 5/28/2017 Yes        Acute renal injury ([facility] ICD-10-CM: N17.9  ICD-9-CM: 371. 9 Acute 5/28/2017 Yes        Fall at home ICD-10-CM: [address] [place], Z67.154  ICD-9-CM: E888.9, E849.0 Present on Admission 5/28/2017 Yes        Elevated troponin ICD-10-CM: R74.8  ICD-9-CM: 790.6 Present on Admission 5/28/2017 Yes        Closed compression fracture of lumbosacral spine ([facility] ICD-10-CM: S32.000A  ICD-9-CM: 805.4 Present on Admission 5/28/2017 Yes        UTI (urinary tract infection) ICD-10-CM: N39.0  ICD-9-CM: 599.0  5/27/2017 Yes        Depression (Chronic) ICD-10-CM: F32.9  ICD-9-CM: 204  1/13/2017 Yes        HTN (hypertension) ICD-10-CM: I10  ICD-9-CM: 401.9  11/30/2015 Yes        Dementia ICD-10-CM: F03.90  ICD-9-CM: 294.20  11/30/2015 Yes        Mixed incontinence urge and stress ICD-10-CM: N39.46  ICD-9-CM: 788.33  8/17/2011 Yes              Laboratory:      Recent Labs      05/30/17   1329   HGB  15.0   HCT  44.2   WBC  9.6   PLT  131*   BUN  22*   CREA  1.15*   K  2.8*         Plan of Care/Planned Procedure:  Risks, benefits, and alternatives reviewed with patient and she agrees to proceed with the procedure.      Plan is for L5 kyphoplasty       Srinivasa Rios MD

## 2017-05-31 NOTE — PROGRESS NOTES
Hospitalist Progress Note    NAME: Abbi Burnett   :  1935   MRN:  [id number]       Interim Hospital Summary: [de-identified] y.o. female whom presented on 2017 with      Assessment / Plan:  NEIL secondary to dehydration with underlying dementia  Encephalopathy with above  Gram neg bacteremia probably secondary to UTI  Leucocytosis ( no sepsis)    Ceftriaxone d5  Blood  Cultures on  with gram neg rods, normally would not repeat with gram neg bacteremia  Repeat cultures  On  ,  So far neg , per radiology wanted to be neg before kyphoplasty  Urine cultures with >100, 000 colonies ( contamination as well)  CT head neg     Hypokalemia   supplementation      Acute L5 compression fracture  MRI: Moderate L5 compression fracture, relatively acute. No associated spinal canal compromise. Pain management  Plan for kyphoplasty today  Needs pt/ot    Dementia  Living in [facility] independent living  Needs assisted living, needing more care recently per the daughter. Cont namenda      Body mass index is 23.76 kg/(m^2). Code status: dnr  Prophylaxis: lovenox  Recommended Disposition:tbd     Subjective:     Chief Complaint / Reason for Physician Visit  Follow up on dementia, compression fracture and uti    Plan for kyphoplasty today, replace K  Discussed with RN events overnight. Review of Systems:  Symptom Y/N Comments  Symptom Y/N Comments   Fever/Chills n   Chest Pain n    Poor Appetite    Edema     Cough n   Abdominal Pain n    Sputum    Joint Pain     SOB/MOTT n   Pruritis/Rash     Nausea/vomit    Tolerating PT/OT     Diarrhea    Tolerating Diet     Constipation    Other       Could NOT obtain due to:      Objective:     VITALS:   Last 24hrs VS reviewed since prior progress note.  Most recent are:  Patient Vitals for the past 24 hrs:   Temp Pulse Resp BP SpO2   17 0402 97.2 °F (36.2 °C) (!) 54 16 121/60 95 %   17 0014 98.5 °F (36.9 °C) 68 17 143/72 92 %   17 98.6 °F (37 °C) 61 18 131/72 94 %   05/30/17 1855 98.1 °F (36.7 °C) (!) 58 18 133/64 93 %   05/30/17 1459 98 °F (36.7 °C) 82 18 139/70 96 %   05/30/17 1107 98.6 °F (37 °C) 63 18 134/65 93 %       Intake/Output Summary (Last 24 hours) at 05/31/17 0838  Last data filed at 05/31/17 0014   Gross per 24 hour   Intake              180 ml   Output                0 ml   Net              180 ml        PHYSICAL EXAM:  General: WD, WN. Alert, cooperative,   no acute distress    EENT:  EOMI. Anicteric sclerae. MMM  Resp:  B/l air entry. No accessory muscle use  CV:  Regular  rhythm,  No edema  GI:  Soft, Non distended, Non tender.  +Bowel sounds  Neurologic:  Alert and oriented X 3, normal speech,   Psych:   Good insight. Not anxious nor agitated  Skin:  No rashes. No jaundice    Reviewed most current lab test results and cultures  YES  Reviewed most current radiology test results   YES  Review and summation of old records today    NO  Reviewed patient's current orders and MAR    YES  PMH/SH reviewed - no change compared to H&P  ________________________________________________________________________  Care Plan discussed with:    Comments   Patient     Family  y    RN y    Care Manager     Consultant                        Multidiciplinary team rounds were held today with , nursing, pharmacist and clinical coordinator. Patient's plan of care was discussed; medications were reviewed and discharge planning was addressed. ________________________________________________________________________  Total NON critical care TIME:  35   Minutes    Total CRITICAL CARE TIME Spent:   Minutes non procedure based      Comments   >50% of visit spent in counseling and coordination of care     ________________________________________________________________________  Louis Bence, MD     Procedures: see electronic medical records for all procedures/Xrays and details which were not copied into this note but were reviewed prior to creation of Plan. LABS:  I reviewed today's most current labs and imaging studies.   Pertinent labs include:  Recent Labs      05/30/17   1329   WBC  9.6   HGB  15.0   HCT  44.2   PLT  131*     Recent Labs      05/30/17   1329   NA  143   K  2.8*   CL  109*   CO2  25   GLU  123*   BUN  22*   CREA  1.15*   CA  7.5*       Signed: Chaka Yun MD

## 2017-05-31 NOTE — PROGRESS NOTES
Problem: Mobility Impaired (Adult and Pediatric)  Goal: *Acute Goals and Plan of Care (Insert Text)  Physical Therapy Goals  Initiated 5/29/2017  1. Patient will move from supine to sit and sit to supine , scoot up and down and roll side to side in bed with modified independence within 7 day(s). 2. Patient will transfer from bed to chair and chair to bed with modified independence using the least restrictive device within 7 day(s). 3. Patient will perform sit to stand with modified independence within 7 day(s). 4. Patient will ambulate with supervision/set-up for 150 feet with the least restrictive device within 7 day(s). .   PHYSICAL THERAPY TREATMENT  Patient: Ardine Rubinstein (34 y.o. female)  Date: 5/31/2017  Diagnosis: UTI (urinary tract infection) Sepsis, Gram negative (HCC)       Precautions: Fall, DNR      ASSESSMENT:  Patient with improving endurance, functional mobility, though remains limited by mild generalized weakness, stability, and overall (potentially baseline) mental status. Received in supine, agreeable, with information provided on upcoming kyphoplasty procedure. Patient able to mobilize with largely CGA at most as noted below, as when given time to complete task able to do so fully with additional time. She did require min A for rollator brake/directional management, especially as she sat prematurely once concluding gait period with rollator in poor position and brakes unlocked. Educated on importance of safe utilization of rollator ahead, as well as benefit of sit<>stand repeats which she refused beyond x3 repetitions. Concluded in supine with daughter having arrived and supportive. Recommend SNF at [place].       Progression toward goals:  [X]    Improving appropriately and progressing toward goals  [ ]    Improving slowly and progressing toward goals  [ ]    Not making progress toward goals and plan of care will be adjusted       PLAN:  Patient continues to benefit from skilled intervention to address the above impairments. Continue treatment per established plan of care. Discharge Recommendations:  [facility]  Further Equipment Recommendations for Discharge:  Defer        SUBJECTIVE:   Patient stated no way.       OBJECTIVE DATA SUMMARY:   Critical Behavior:  Neurologic State: Alert, Confused  Orientation Level: Oriented to person, Oriented to place, Disoriented to situation, Disoriented to time  Cognition: Memory loss  Safety/Judgement: Decreased insight into deficits  Functional Mobility Training:  Bed Mobility:  Rolling: Stand-by asssistance  Supine to Sit: Stand-by asssistance  Sit to Supine: Minimum assistance  Scooting: Stand-by asssistance        Transfers:  Sit to Stand: Contact guard assistance; Additional time (manual cues for hand placement )  Stand to Sit: Contact guard assistance (manual cues for hand placement )                             Balance:  Sitting: Intact; Without support  Standing: Impaired; With support  Standing - Static: Good;Constant support  Standing - Dynamic : Good  Ambulation/Gait Training:  Distance (ft): 75 Feet (ft)  Assistive Device: Walker, rollator  Ambulation - Level of Assistance: Contact guard assistance;Minimal assistance (Min A for rollator brake management and directional control)     Gait Description (WDL): Exceptions to WDL  Gait Abnormalities: Decreased step clearance              Speed/Jie: Pace decreased (<100 feet/min)                             VC's largely ineffective for rollator management, requiring manual A to control directionally and with correct utilization of brakes   Therapeutic Exercises:   x3 sit<>stand from EOB with CGA and hands in lap - refused further      Pain:  Pain Scale 1: Visual  Pain Intensity 1: 0              Activity Tolerance:   Improving endurance capacity noted      Please refer to the flowsheet for vital signs taken during this treatment.   After treatment:   [ ]    Patient left in no apparent distress sitting up in chair  [X]    Patient left in no apparent distress in bed  [X]    Call bell left within reach  [X]    Nursing notified  [X]    Caregiver present  [ ]    Bed alarm activated      COMMUNICATION/COLLABORATION:   The patients plan of care was discussed with: Registered Nurse     Oriana Wells, PT, DPT          Time Calculation: 19 mins

## 2017-05-31 NOTE — PROGRESS NOTES
End of Shift Nursing Note    Bedside shift change report given to Akiko Stewart Tijerina (oncoming nurse) by Kian Linares RN (offgoing nurse). Report included the following information SBAR, Kardex, Intake/Output and Recent Results. Zone Phone:   [phone]    Significant changes during shift:    NPO after midnight   Non-emergent issues for physician to address:   none     Number times ambulated in hallway past shift: 0      Number of times OOB to chair past shift: 0    POD #: n/a     Vital Signs:    Temp: 98.5 °F (36.9 °C)     Pulse (Heart Rate): 68     BP: 143/72     Resp Rate: 17     O2 Sat (%): 92 %    Lines & Drains:     Urinary Catheter? No  Central Line? No  PICC Line? No    NG tube [] in [] removed [x] not applicable   Drains [] in [] removed [x] not applicable     Skin Integrity:      Wounds: no   Dressings Present: no    Wound Concerns: no      GI:    Current diet:  DIET NUTRITIONAL SUPPLEMENTS Breakfast, Lunch; Ensure Clear  DIET NUTRITIONAL SUPPLEMENTS Dinner, AM Snack; Magic Cups  DIET NUTRITIONAL SUPPLEMENTS Lunch; Pudding, Fortified  DIET NPO    Nausea: NO  Vomiting: NO  Bowel Sounds: YES  Flatus: NO  Last Bowel Movement: unknown    Respiratory:  Supplemental O2: No       Incentive Spirometer: NO    Coughing and Deep Breathing: YES  Oral Care: YES  Understanding (patient/family education): YES   Getting out of bed: YES  Head of bed elevation: YES    Patient Safety:    Falls Score: 5  Mobility Score: 2  Bed Alarm On? Yes  Sitter? No  Opportunity for questions and clarification was given to oncoming nurse. Patient bed is in low position, side rails are up x 3, door & observation blinds open as needed, call bell within reach and patient not in distress.     Anamaria Honeycutt

## 2017-05-31 NOTE — PROGRESS NOTES
Nutrition Assessment:    INTERVENTIONS/RECOMMENDATIONS:   Meals/Snacks: General/healthful diet: Continue Regular Diet  Supplements: Commercial supplement: Continue Ensure Pudding Daily and Ensure Clear & Magic Cup BID  Feeding Assistance: Feeding assistance at meals   SLP Consult     ASSESSMENT:   Chart reviewed; Pt seen at bedside with daughter present. Pt preparing for Kyphoplasty today. Pt's daughter states that the pt had a good appetite yesterday; pt NPO today. Daughter states the pt has had a decline in appetite over the last year and a a half. Per EMR, 14% wt loss in 8 months; pts weight has remained stable within the last 5 months. Daughter states pt tolerance to diet and ONS. Daughter denies N/V and abdominal pain for pt. Daughter reports need for assisted feeds since admission due to weakness. Daughter reports pt has slowed mastication, noting she timed her mother and the pt consumed 3 bites within 35 minutes. Daughter notes pt difficulty with swallowing pills PTA; pt would benefit from an SLP consult to evaluate. Labs reviewed: Low K+, being repleted. BM noted on 5/30. Will monitor pt appetite/PO intake, changes in diet and tolerance to ONS for further nutritional recommendations. Diet Order: Regular, Other (comment) (Supplements: Ensure Pudding Daily; MC & En Clear BID)  % Eaten:  Patient Vitals for the past 72 hrs:   % Diet Eaten   05/28/17 1956 25 %     Pertinent Medications: [x] Reviewed []Other: rocephin; lexapro; namenda; lovenox; norco; narcan; KCl  Pertinent Labs: [x]Reviewed  []Other: K 2.8; BUN 22; Cr 1.15  Food Allergies: [x]None []Other:     Last BM: 5/30   [x]Active     []Hyperactive  []Hypoactive       [] Absent  BS  Skin:    [] Intact   [] Incision  [x] Breakdown: excoriation  []Edema   []Other:    Anthropometrics: Height: 5' 4\" (162.6 cm) Weight: 65.8 kg (145 lb)    IBW (%IBW):   ( ) UBW (%UBW):   (  %)    BMI: Body mass index is 23.76 kg/(m^2).     This BMI is indicative of:  []Underweight   [x]Normal   []Overweight   [] Obesity   [] Extreme Obesity (BMI>40)  Last Weight Metrics:  Weight Loss Metrics 5/28/2017 4/14/2017 1/27/2017 1/13/2017 10/13/2016 9/25/2016 7/13/2016   Today's Wt 145 lb 147 lb 144 lb 147 lb 157 lb 168 lb 168 lb 9.6 oz   BMI 23.76 kg/m2 24.09 kg/m2 23.6 kg/m2 24.09 kg/m2 25.73 kg/m2 27.53 kg/m2 28.93 kg/m2       Estimated Nutrition Needs (Based on): 1475 Kcals/day (MSJ: 1125 x 1.3) , 80 g (1.2 g/kg) Protein  Carbohydrate: At Least 130 g/day  Fluids: 1500 mL/day or per MD     Pt expected to meet estimated nutrient needs: [x]Yes []No    NUTRITION DIAGNOSES:   Problem:  Inadequate oral food/beverage intake Unintended weight loss    Etiology: related to poor appetite poor appetite   Signs/Symptoms: as evidenced by family report poor PO intake 14% wt loss in ~1 year    Prior Dx: Progressing    NUTRITION INTERVENTIONS:  Meals/Snacks: General/healthful diet   Supplements: Commercial supplement Feeding Assistance: Feeding assistance at meals            GOAL:   Pt will consume >50% meals and >75% ONS next 3-5 days     Prior Goal: consume >50% of meals and ONS in 2-4 days  Prior Goal: Progressing    NUTRITION MONITORING AND EVALUATION      Food/Nutrient Intake Outcomes:  Total energy intake  Physical Signs/Symptoms Outcomes: Weight/weight change, GI, Electrolyte and renal profile, GI profile, Glucose profile    Previous Goal Met:   [] Met              [x] Progressing Towards Goal              [] Not Progressing Towards Goal   Previous Recommendations:   [x] Implemented          [] Not Implemented          [] Not Applicable    LEARNING NEEDS (Diet, Food/Nutrient-Drug Interaction):    [x] None Identified   [] Identified and Education Provided/Documented   [] Identified and Pt declined/was not appropriate     Cultural, Alevism, OR Ethnic Dietary Needs:    [x] None Identified   [] Identified and Addressed     [x] Interdisciplinary Care Plan Reviewed/Documented    [x] Discharge Planning: General Healthful Diet + 1-3 supplements daily, as needed   [] Participated in Interdisciplinary Rounds    NUTRITION RISK:    [] High              [x] Moderate           []  Low  []  Minimal/Uncompromised      [facility]  Pager [phone]  Weekend Pager [phone]

## 2017-05-31 NOTE — PROGRESS NOTES
CM reviewed previus CM notes then selected [facility] as disposition. Pt has been accepted to [facility] for discharge. Care Management Interventions  PCP Verified by CM: Yes  Mode of Transport at Discharge: Other (see comment) (Pt may need transport at discharge, TBD closer to discharge)  Transition of Care Consult (CM Consult): SNF ([facility] )  Partner SNF: Yes  Physical Therapy Consult: Yes  Occupational Therapy Consult: Yes  Current Support Network:  Other (Pt lives in independent living at [facility])  Confirm Follow Up Transport: Family (Pt family assists with transportation to her appointments)  Plan discussed with Pt/Family/Caregiver: Yes  Freedom of Choice Offered: Yes  Discharge Location  Discharge Placement: Skilled nursing facility [facility])     AMAN Motta, [address] [place], [place] [phone]

## 2017-05-31 NOTE — ROUTINE PROCESS
End of Shift Nursing Note    Bedside shift change report given to Ani Arroyo RN (oncoming nurse) by Fidel Alonzo RN (offgoing nurse). Report included the following information SBAR, Kardex, Procedure Summary, Intake/Output, MAR, Recent Results and Med Rec Status. Significant changes during shift:    none   Non-emergent issues for physician to address:   none     Number times ambulated in hallway past shift: 0      Number of times OOB to chair past shift: 0       Vital Signs:    Temp: 97.3 °F (36.3 °C)     Pulse (Heart Rate): (!) 59     BP: 121/54     Resp Rate: 20     O2 Sat (%): 97 %    Lines & Drains:     Urinary Catheter? No   Placement Date:    Medical Necessity:   Central Line? No   Placement Date:    Medical Necessity:   PICC Line? No   Placement Date:    Medical Necessity:     NG tube [] in [] removed [] not applicable   Drains [] in [] removed [] not applicable     Skin Integrity:      Wounds: no   Dressings Present: yes    Wound Concerns: no      GI:    Current diet:  DIET NUTRITIONAL SUPPLEMENTS Breakfast, Lunch; Ensure Clear  DIET NUTRITIONAL SUPPLEMENTS Dinner, AM Snack; Magic Cups  DIET NUTRITIONAL SUPPLEMENTS Lunch; Pudding, Fortified  DIET REGULAR    Nausea: NO  Vomiting: NO  Bowel Sounds: YES  Flatus: YES  Last Bowel Movement: several days ago   Appearance:     Respiratory:  Supplemental O2: Yes      Device: nasal cannula   via 2 Liters/min     Incentive Spirometer: YES    Coughing and Deep Breathing: YES  Oral Care: YES  Understanding (patient/family education): YES   Getting out of bed: YES  Head of bed elevation: YES    Patient Safety:    Falls Score: 1  Mobility Score:1.0  Sitter? No      Opportunity for questions and clarification was given to oncoming nurse. Patient bed is in lowest position, side rails are up x 2, door & observation blinds open as needed, call bell within reach and patient not in distress.     [address] [facility]

## 2017-06-01 LAB
ANION GAP BLD CALC-SCNC: 7 MMOL/L (ref 5–15)
BUN SERPL-MCNC: 23 MG/DL (ref 6–20)
BUN/CREAT SERPL: 26 (ref 12–20)
CALCIUM SERPL-MCNC: 7.6 MG/DL (ref 8.5–10.1)
CHLORIDE SERPL-SCNC: 110 MMOL/L (ref 97–108)
CO2 SERPL-SCNC: 28 MMOL/L (ref 21–32)
CREAT SERPL-MCNC: 0.9 MG/DL (ref 0.55–1.02)
GLUCOSE SERPL-MCNC: 68 MG/DL (ref 65–100)
POTASSIUM SERPL-SCNC: 3.2 MMOL/L (ref 3.5–5.1)
SODIUM SERPL-SCNC: 145 MMOL/L (ref 136–145)

## 2017-06-01 PROCEDURE — 97530 THERAPEUTIC ACTIVITIES: CPT | Performed by: OCCUPATIONAL THERAPIST

## 2017-06-01 PROCEDURE — 74011250636 HC RX REV CODE- 250/636: Performed by: INTERNAL MEDICINE

## 2017-06-01 PROCEDURE — 80048 BASIC METABOLIC PNL TOTAL CA: CPT | Performed by: HOSPITALIST

## 2017-06-01 PROCEDURE — 97116 GAIT TRAINING THERAPY: CPT

## 2017-06-01 PROCEDURE — 97535 SELF CARE MNGMENT TRAINING: CPT | Performed by: OCCUPATIONAL THERAPIST

## 2017-06-01 PROCEDURE — 36415 COLL VENOUS BLD VENIPUNCTURE: CPT | Performed by: HOSPITALIST

## 2017-06-01 PROCEDURE — 92610 EVALUATE SWALLOWING FUNCTION: CPT

## 2017-06-01 PROCEDURE — 74011250637 HC RX REV CODE- 250/637: Performed by: INTERNAL MEDICINE

## 2017-06-01 PROCEDURE — 65270000029 HC RM PRIVATE

## 2017-06-01 PROCEDURE — 74011250637 HC RX REV CODE- 250/637: Performed by: HOSPITALIST

## 2017-06-01 PROCEDURE — 74011250636 HC RX REV CODE- 250/636: Performed by: HOSPITALIST

## 2017-06-01 RX ORDER — POTASSIUM CHLORIDE 750 MG/1
40 TABLET, FILM COATED, EXTENDED RELEASE ORAL
Status: COMPLETED | OUTPATIENT
Start: 2017-06-01 | End: 2017-06-01

## 2017-06-01 RX ORDER — CEPHALEXIN 250 MG/1
500 CAPSULE ORAL 2 TIMES DAILY
Status: DISCONTINUED | OUTPATIENT
Start: 2017-06-01 | End: 2017-06-03 | Stop reason: HOSPADM

## 2017-06-01 RX ORDER — POTASSIUM CHLORIDE 7.45 MG/ML
10 INJECTION INTRAVENOUS ONCE
Status: COMPLETED | OUTPATIENT
Start: 2017-06-01 | End: 2017-06-02

## 2017-06-01 RX ORDER — OXYCODONE AND ACETAMINOPHEN 5; 325 MG/1; MG/1
1-2 TABLET ORAL
Status: DISCONTINUED | OUTPATIENT
Start: 2017-06-01 | End: 2017-06-03 | Stop reason: HOSPADM

## 2017-06-01 RX ADMIN — HYDROCODONE BITARTRATE AND ACETAMINOPHEN 1 TABLET: 5; 325 TABLET ORAL at 14:35

## 2017-06-01 RX ADMIN — OXYCODONE HYDROCHLORIDE AND ACETAMINOPHEN 2 TABLET: 5; 325 TABLET ORAL at 16:33

## 2017-06-01 RX ADMIN — Medication 1 CAPSULE: at 12:51

## 2017-06-01 RX ADMIN — POTASSIUM CHLORIDE 40 MEQ: 750 TABLET, FILM COATED, EXTENDED RELEASE ORAL at 12:51

## 2017-06-01 RX ADMIN — CEPHALEXIN 500 MG: 250 CAPSULE ORAL at 12:50

## 2017-06-01 RX ADMIN — Medication 10 ML: at 06:13

## 2017-06-01 RX ADMIN — AMLODIPINE BESYLATE 5 MG: 5 TABLET ORAL at 09:31

## 2017-06-01 RX ADMIN — CEPHALEXIN 500 MG: 250 CAPSULE ORAL at 20:38

## 2017-06-01 RX ADMIN — OXYCODONE HYDROCHLORIDE AND ACETAMINOPHEN 2 TABLET: 5; 325 TABLET ORAL at 20:37

## 2017-06-01 RX ADMIN — HYDROCODONE BITARTRATE AND ACETAMINOPHEN 1 TABLET: 5; 325 TABLET ORAL at 09:31

## 2017-06-01 RX ADMIN — Medication 10 ML: at 13:21

## 2017-06-01 RX ADMIN — Medication 10 ML: at 20:41

## 2017-06-01 RX ADMIN — DONEPEZIL HYDROCHLORIDE 10 MG: 5 TABLET, FILM COATED ORAL at 20:38

## 2017-06-01 RX ADMIN — ESCITALOPRAM OXALATE 10 MG: 10 TABLET ORAL at 09:31

## 2017-06-01 RX ADMIN — POTASSIUM CHLORIDE 10 MEQ: 10 INJECTION, SOLUTION INTRAVENOUS at 12:51

## 2017-06-01 RX ADMIN — ASPIRIN 81 MG: 81 TABLET, COATED ORAL at 09:31

## 2017-06-01 RX ADMIN — ENOXAPARIN SODIUM 30 MG: 60 INJECTION SUBCUTANEOUS at 18:53

## 2017-06-01 RX ADMIN — MEMANTINE 5 MG: 10 TABLET ORAL at 09:31

## 2017-06-01 NOTE — PROGRESS NOTES
Problem: Dysphagia (Adult)  Goal: *Acute Goals and Plan of Care (Insert Text)  6/1/2017  Speech path goals  1. Pt will tolerate dys 2/minced diet with thins with no overt s/s of aspiration. SPEECH LANGUAGE PATHOLOGY BEDSIDE SWALLOW EVALUATION  Patient: Ruben Medrano (10 y.o. female)  Date: 6/1/2017  Primary Diagnosis: UTI (urinary tract infection)        Precautions:   Fall, DNR      ASSESSMENT :  Based on the objective data described below, the patient presents with mild oropharyngeal dysphagia. Mildly slow mastication of solids. Pt has reduced dentition and cannot masticate hard solids well. Mild swallow delay noted. No coughing or choking noted. Her dementia is limiting her and she masticates food for an extended period of time per her dtr pta. The dtr also stated that occasionally her mother had pills on the floor or in her bed. She has probably been holding them orally. nsg should check her mouth throroughly after administration. Patient will benefit from skilled intervention to address the above impairments. Patients rehabilitation potential is considered to be Fair  Factors which may influence rehabilitation potential include:   [ ]            None noted  [X]            Mental ability/status  [X]            Medical condition  [ ]            Home/family situation and support systems  [ ]            Safety awareness  [ ]            Pain tolerance/management  [ ]            Other:        PLAN :  Recommendations and Planned Interventions:  Diet downgrade to dys 2/minced and thins  Frequency/Duration: Patient will be followed by speech-language pathology 2 times a week to address goals. Discharge Recommendations: None       SUBJECTIVE:   Patient stated she her back was hurting some when sitting up.        OBJECTIVE:       Past Medical History:   Diagnosis Date    Arthritis       hands    Hypertension      Incontinence of urine      Other ill-defined conditions       freq UTIs    Thromboembolus ([facility] [address] 5-2011     left leg    Unspecified adverse effect of anesthesia       PONV     Past Surgical History:   Procedure Laterality Date    BREAST SURGERY PROCEDURE UNLISTED         right breast bx x2    HX CATARACT REMOVAL   2008     zoe    HX CHOLECYSTECTOMY        HX UROLOGICAL   2006     bladder sling    VASCULAR SURGERY PROCEDURE UNLIST   6-2011     left leg vein procedure DVT     Prior Level of Function/Home Situation:   Home Situation  Home Environment: [place] [address] Name: [facility]  One/Two Story Residence: One story  Living Alone: Yes  Support Systems: Child(jm), Family member(s)  Patient Expects to be Discharged to[de-identified] Patient room  Current DME Used/Available at Home: Sandra Dye, rollator  Diet prior to admission:  Current Diet:  Reg/thins. Cognitive and Communication Status:  Neurologic State: Alert  Orientation Level: Oriented to person  Cognition: Memory loss, Follows commands  Perception: Appears intact  Perseveration: No perseveration noted  Safety/Judgement: Decreased insight into deficits  Oral Assessment:  Oral Assessment  Labial: No impairment  Dentition: Natural;Extractions  Lingual: No impairment  Mandible: No impairment  P.O. Trials:  Patient Position: upright in bed  Vocal quality prior to P.O.: No impairment  Consistency Presented: Thin liquid;Puree; Solid  How Presented: Self-fed/presented;Straw     Bolus Acceptance: No impairment  Bolus Formation/Control: No impairment     Propulsion: Delayed (# of seconds)  Oral Residue: None  Initiation of Swallow: No impairment  Laryngeal Elevation: Functional  Aspiration Signs/Symptoms: None  Pharyngeal Phase Characteristics: No impairment, issues, or problems               Oral Phase Severity: Mild  Pharyngeal Phase Severity : Mild     NOMS:   The NOMS functional outcome measure was used to quantify this patient's level of swallowing impairment.   Based on the NOMS, the patient was determined to be at level  for swallow function      G Codes: In compliance with CMSs Claims Based Outcome Reporting, the following G-code set was chosen for this patient based the use of the NOMS functional outcome to quantify this patient's level of swallowing impairment. Using the NOMS, the patient was determined to be at level 5 for swallow function which correlates with the CJ= 20-39% level of severity. Based on the objective assessment provided within this note, the current, goal, and discharge g-codes are as follows:     Swallow  Swallowing:   Swallow Current Status CJ= 20-39%   Swallow Goal Status CJ= 20-39%         NOMS Swallowing Levels:  Level 1 (CN): NPO  Level 2 (CM): NPO but takes consistency in therapy  Level 3 (CL): Takes less than 50% of nutrition p.o. and continues with nonoral feedings; and/or safe with mod cues; and/or max diet restriction  Level 4 (CK): Safe swallow but needs mod cues; and/or mod diet restriction; and/or still requires some nonoral feeding/supplements  Level 5 (CJ): Safe swallow with min diet restriction; and/or needs min cues  Level 6 (CI): Independent with p.o.; rare cues; usually self cues; may need to avoid some foods or needs extra time  Level 7 ([address] [place] [address]): Independent for all p.o.  EMEKA. (2003). National Outcomes Measurement System (NOMS): Adult Speech-Language Pathology User's Guide. Pain:  Pain Scale 1: Visual  Pain Intensity 1: 2     After treatment:   [ ]            Patient left in no apparent distress sitting up in chair  [X]            Patient left in no apparent distress in bed  [X]            Call bell left within reach  [X]            Nursing notified  [ ]            Caregiver present  [ ]            Bed alarm activated      COMMUNICATION/EDUCATION:   The patients plan of care including recommendations, planned interventions, and recommended diet changes were discussed with: Registered Nurse. [ ]            Posted safety precautions in patient's room.   [X] Patient/family have participated as able in goal setting and plan of care. [ ]            Patient/family agree to work toward stated goals and plan of care. [ ]            Patient understands intent and goals of therapy, but is neutral about his/her participation. [ ]            Patient is unable to participate in goal setting and plan of care.      Thank you for this referral.  Shauna Davis SLP  Time Calculation: 15 mins

## 2017-06-01 NOTE — PROGRESS NOTES
Hospitalist Progress Note    NAME: Charla Perez   :  1935   MRN:  [id number]       Interim Hospital Summary: [de-identified] y.o. female whom presented on 2017 with      Assessment / Plan:  NEIL secondary to dehydration with underlying dementia  Encephalopathy with above  Gram neg bacteremia probably secondary to UTI  Leucocytosis ( no sepsis)    Ceftriaxone d6, change to keflex to complete the course  Blood  Cultures on  with gram neg rods, normally would not repeat with gram neg bacteremia  Urine cultures with >100, 000 colonies ( contamination as well)  CT head neg     Hypokalemia  replace      Acute L5 compression fracture  MRI: Moderate L5 compression fracture, relatively acute. No associated spinal canal compromise. Pain management  S/p kyphoplasty, seen by PT, for snf      Dementia  Living in [facility] independent living  Needs assisted living, needing more care recently per the daughter. Caesar namenda      Body mass index is 23.76 kg/(m^2). Code status: dnr  Prophylaxis: lovenox  Recommended Disposition:tbd     Subjective:     Chief Complaint / Reason for Physician Visit  Follow up on dementia, compression fracture and uti    Feeling better, pain improved  Speech to see  Discussed with RN events overnight. Review of Systems:  Symptom Y/N Comments  Symptom Y/N Comments   Fever/Chills n   Chest Pain n    Poor Appetite    Edema     Cough n   Abdominal Pain n    Sputum    Joint Pain     SOB/MOTT n   Pruritis/Rash     Nausea/vomit    Tolerating PT/OT     Diarrhea    Tolerating Diet     Constipation    Other       Could NOT obtain due to:      Objective:     VITALS:   Last 24hrs VS reviewed since prior progress note.  Most recent are:  Patient Vitals for the past 24 hrs:   Temp Pulse Resp BP SpO2   17 0930 98.2 °F (36.8 °C) 72 16 114/49 95 %   17 0502 - - - - 95 %   17 0455 98 °F (36.7 °C) 65 20 110/45 93 %   17 0036 98.3 °F (36.8 °C) 66 19 112/49 98 %   17 1952 96.5 °F (35.8 °C) 65 20 112/48 98 %   05/31/17 1839 97.4 °F (36.3 °C) (!) 59 20 147/61 98 %   05/31/17 1719 97.3 °F (36.3 °C) (!) 59 20 121/54 -   05/31/17 1645 - 88 20 121/53 97 %   05/31/17 1633 - 84 20 129/54 98 %   05/31/17 1628 - 85 18 132/56 98 %   05/31/17 1623 - 88 20 120/56 98 %   05/31/17 1618 - 85 20 121/59 98 %   05/31/17 1613 - 88 20 127/52 96 %   05/31/17 1608 - 86 18 123/55 95 %   05/31/17 1604 - 65 16 125/57 94 %   05/31/17 1559 - 65 18 124/56 94 %   05/31/17 1554 - 66 18 104/55 96 %   05/31/17 1549 - 63 18 105/58 96 %   05/31/17 1544 - 65 18 104/53 98 %   05/31/17 1539 - 64 18 98/54 98 %   05/31/17 1534 - 61 16 102/50 98 %   05/31/17 1529 - 61 18 113/55 98 %   05/31/17 1524 - 65 16 112/51 98 %   05/31/17 1519 - 67 18 128/59 98 %   05/31/17 1514 - 66 18 127/65 98 %   05/31/17 1452 97.7 °F (36.5 °C) 64 18 128/57 94 %   05/31/17 1236 97.9 °F (36.6 °C) 64 16 148/69 93 %       Intake/Output Summary (Last 24 hours) at 06/01/17 1037  Last data filed at 06/01/17 1030   Gross per 24 hour   Intake              850 ml   Output                2 ml   Net              848 ml        PHYSICAL EXAM:  General: WD, WN. Alert,  cooperative,   no acute distress    EENT:  EOMI. Anicteric sclerae. MMM  Resp:  B/l air entry No accessory muscle use  CV:  Regular  rhythm,  No edema  GI:  Soft, Non distended, Non tender.  +Bowel sounds  Neurologic:  Alert and oriented X 3, normal speech,   Psych:   Good insight. Not anxious nor agitated  Skin:  No rashes.   No jaundice    Reviewed most current lab test results and cultures  YES  Reviewed most current radiology test results   YES  Review and summation of old records today    NO  Reviewed patient's current orders and MAR    YES  PMH/SH reviewed - no change compared to H&P  ________________________________________________________________________  Care Plan discussed with:    Comments   Patient y    Family  y    RN y    Care Manager     Consultant Multidiciplinary team rounds were held today with , nursing, pharmacist and clinical coordinator. Patient's plan of care was discussed; medications were reviewed and discharge planning was addressed. ________________________________________________________________________  Total NON critical care TIME:  25   Minutes    Total CRITICAL CARE TIME Spent:   Minutes non procedure based      Comments   >50% of visit spent in counseling and coordination of care     ________________________________________________________________________  Lui Hampton MD     Procedures: see electronic medical records for all procedures/Xrays and details which were not copied into this note but were reviewed prior to creation of Plan. LABS:  I reviewed today's most current labs and imaging studies.   Pertinent labs include:  Recent Labs      05/30/17   1329   WBC  9.6   HGB  15.0   HCT  44.2   PLT  131*     Recent Labs      06/01/17   0250  05/30/17   1329   NA  145  143   K  3.2*  2.8*   CL  110*  109*   CO2  28  25   GLU  68  123*   BUN  23*  22*   CREA  0.90  1.15*   CA  7.6*  7.5*       Signed: Lui Hampton MD

## 2017-06-01 NOTE — PROGRESS NOTES
Problem: Mobility Impaired (Adult and Pediatric)  Goal: *Acute Goals and Plan of Care (Insert Text)  Physical Therapy Goals  Initiated 5/29/2017  1. Patient will move from supine to sit and sit to supine , scoot up and down and roll side to side in bed with modified independence within 7 day(s). 2. Patient will transfer from bed to chair and chair to bed with modified independence using the least restrictive device within 7 day(s). 3. Patient will perform sit to stand with modified independence within 7 day(s). 4. Patient will ambulate with supervision/set-up for 150 feet with the least restrictive device within 7 day(s). .   PHYSICAL THERAPY TREATMENT  Patient: Brien Tamayo (75 y.o. female)  Date: 6/1/2017  Diagnosis: UTI (urinary tract infection) Sepsis, Gram negative (HCC)       Precautions: Fall, DNR  Chart, physical therapy assessment, plan of care and goals were reviewed. ASSESSMENT: pt still in a lot of pain (nurse notified) which is limiting her mobility at this time, did fair with bed mob and transfers, will do well once pain is managed has good motivation, vc's for safety and proper RW use. Progression toward goals:  [ ]      Improving appropriately and progressing toward goals  [X]      Improving slowly and progressing toward goals  [ ]      Not making progress toward goals and plan of care will be adjusted       PLAN:  Patient continues to benefit from skilled intervention to address the above impairments. Continue treatment per established plan of care.    Discharge Recommendations:  Aramni RODAS  Further Equipment Recommendations for Discharge: has rollator       OBJECTIVE DATA SUMMARY:      Critical Behavior:  Neurologic State: Alert  Orientation Level: Oriented to person  Cognition: Memory loss, Follows commands  Safety/Judgement: Decreased insight into deficits      Functional Mobility Training:  Bed Mobility:  Rolling: Stand-by asssistance  Supine to Sit: Minimum assistance;Assist x1  Scooting: Stand-by asssistance  Level of Assistance: Minimum assistance              Interventions: Tactile cues; Verbal cues      Transfers:  Sit to Stand: Contact guard assistance  Stand to Sit: Contact guard assistance  Bed to Chair: Contact guard assistance  Interventions: Tactile cues; Verbal cues  Level of Assistance: Contact guard assistance      Balance:  Sitting: Intact; Without support  Standing: Intact; With support  Standing - Static: Good;Constant support  Standing - Dynamic : Good      Ambulation/Gait Training:  Distance (ft): 60 Feet (ft)  Assistive Device: Gait belt;Walker, rollator  Ambulation - Level of Assistance: Contact guard assistance  Gait Abnormalities: Decreased step clearance  Right Side Weight Bearing: Full  Left Side Weight Bearing: Full  Base of Support: Narrowed  Speed/Jie: Pace decreased (<100 feet/min)  Step Length: Left shortened;Right shortened     Pain:  Pain Scale 1: Numeric (0 - 10)  Pain Intensity 1: 9  Pain Location 1: Back  Pain Orientation 1: Lower  Pain Description 1: Aching;Constant  Pain Intervention(s) 1: Nurse notified      Activity Tolerance: poor     After treatment:   [X] Patient left in no apparent distress (but in pain) sitting up in chair  [ ] Patient left in no apparent distress in bed  [X] Call bell left within reach  [X] Nursing notified  [X] Caregiver present  [ ] Bed alarm activated      COMMUNICATION/COLLABORATION:   The patients plan of care was discussed with: Registered Nurse     Brayan Linder PTA   Time Calculation: 20 mins

## 2017-06-01 NOTE — ROUTINE PROCESS
End of Shift Nursing Note    Bedside shift change report given to Mary Gould RN (oncoming nurse) by Liss Aguilar RN  (offgoing nurse). Report included the following information SBAR, Kardex, Intake/Output, MAR and Recent Results. Significant changes during shift:    none   Non-emergent issues for physician to address:   none     Number times ambulated in hallway past shift: 0      Number of times OOB to chair past shift: 0         Vital Signs:    Temp: 98 °F (36.7 °C)     Pulse (Heart Rate): 63     BP: 133/57     Resp Rate: 16     O2 Sat (%): 95 %    Lines & Drains:     Urinary Catheter? No   Placement Date:    Medical Necessity:   Central Line? No   Placement Date:    Medical Necessity:   PICC Line? No   Placement Date:    Medical Necessity:     NG tube [] in [] removed [] not applicable   Drains [] in [] removed [] not applicable     Skin Integrity:      Wounds: no   Dressings Present: no    Wound Concerns: no      GI:    Current diet:  DIET NUTRITIONAL SUPPLEMENTS Breakfast, Lunch; Ensure Clear  DIET NUTRITIONAL SUPPLEMENTS Dinner, AM Snack; Magic Cups  DIET NUTRITIONAL SUPPLEMENTS Lunch; Pudding, Fortified  DIET DYSPHAGIA MECH ALTERED (NDD2)    Nausea: NO  Vomiting: NO  Bowel Sounds: YES  Flatus: YES  Last Bowel Movement: today   Appearance: soft    Respiratory:  Supplemental O2: No      Device:    via  Liters/min     Incentive Spirometer: YES   Coughing and Deep Breathing: YES  Oral Care: YES  Understanding (patient/family education): YES   Getting out of bed: NO  Head of bed elevation: YES    Patient Safety:    Falls Score: 1  Mobility Score: 1.0  Bed Alarm On? Yes  Sitter? No      Opportunity for questions and clarification was given to oncoming nurse. Patient bed is in lowest position, side rails are up x 2, door & observation blinds open as needed, call bell within reach and patient not in distress.     [address] [facility]

## 2017-06-01 NOTE — PROGRESS NOTES
CM spoke with pt and family member: Ms. Eduarda Truong. They inquired about pt's transport to SNF. CM informed pt and family that transportation can be scheduled upon d/c. CM will make referrals as deemed necessry.     AMAN Garcia CM  [phone]

## 2017-06-01 NOTE — PROGRESS NOTES
Pt has been accepted by [facility] for post acute care. Care Management Interventions  PCP Verified by CM: Yes  Mode of Transport at Discharge: Other (see comment) (Pt may need transport at discharge, TBD closer to discharge)  Transition of Care Consult (CM Consult): SNF ([facility] )  Partner SNF: Yes  Physical Therapy Consult: Yes  Occupational Therapy Consult: Yes  Current Support Network:  Other (Pt lives in independent living at [facility])  Confirm Follow Up Transport: Family (Pt family assists with transportation to her appointments)  Plan discussed with Pt/Family/Caregiver: Yes  Freedom of Choice Offered: Yes  Discharge Location  Discharge Placement: Skilled nursing facility [facility])    AMAN Baker, [address] [place] [address]   [phone]

## 2017-06-01 NOTE — PROGRESS NOTES
Problem: Self Care Deficits Care Plan (Adult)  Goal: *Acute Goals and Plan of Care (Insert Text)  Occupational Therapy Goals  Initiated 5/29/2017  1. Patient will perform grooming while standing at the sink with supervision within 7 day(s). 2. Patient will perform upper body dressing with supervision/set-up within 7 day(s). 3. Patient will perform lower body dressing with minimal assistance/contact guard assist within 7 day(s). 4. Patient will perform toilet transfers with supervision/set-up within 7 day(s). 5. Patient will perform all aspects of toileting with minimal assistance within 7 day(s). OCCUPATIONAL THERAPY TREATMENT  Patient: Alfonso Acosta (40 y.o. female)  Date: 6/1/2017  Diagnosis: UTI (urinary tract infection) Sepsis, Gram negative ([facility] [address]       Precautions: Fall, DNR      ASSESSMENT:  Pt seen post ambulation in koenig w/ PT. Pt is POD 1 from L5 Kyphoplasty yesterday. Pt was in need of clean gown (Hiram) and change of protective brief (max to total A). Pt complains of significant pain in her back, but has difficulty rating pain; pt stated 9.5/10 pain and nursing was informed. Pt and daughter educated on importance of frequent position changes to protect back, use of log roll technique to maintain spinal alignment during bed mobility, availability of adaptive aids for lower body adls, benefit of controlled descent onto chair/BSC, and need to report pain to nursing to find the right medication for comfort. Pt required CGA for sit to and from standing and CGA using her rollator and HHA for functional transfer to and from chair to [facility]. Maximal to total assistance needed overall for pericare post several BM(diarrhea-like) during tx session. Daughter reported that pt has been working with her doctor's re: the diarrhea.  Pt's daughter reports that pt has had several bouts of UTI and is incontinent of bowel and bladder, PTA pt was managing her incontinence mostly on her own and generally was independent for basic self care. Daughter reports that pt is not thorough at times with pericare. Pt has baseline dementia and may have difficulty learning new skill--i.e. Using adaptive aids for lower body adls. A trial of using the aids would be beneficial until pain can be controlled enough to allow comfort during adaptive techniques during adls (i.e. Crossed leg technique). Recommend SNF rehab at discharge. Progression toward goals:  [ ]       Improving appropriately and progressing toward goals  [X]       Improving slowly and progressing toward goals  [ ]       Not making progress toward goals and plan of care will be adjusted       PLAN:  Patient continues to benefit from skilled intervention to address the above impairments. Continue treatment per established plan of care. Discharge Recommendations:  Rehab and Skilled Nursing Facility  Further Equipment Recommendations for Discharge:  tbd-may benefit from adaptive aids for lower body adls. SUBJECTIVE:   Patient stated Chicho Pazk you so much.       OBJECTIVE DATA SUMMARY:   Cognitive/Behavioral Status:  Neurologic State: Alert  Orientation Level: Oriented to person  Cognition: Memory loss; Follows commands  Perception: Appears intact  Perseveration: No perseveration noted        Functional Mobility and Transfers for ADLs:  Bed Mobility:  Pt received seated in chair post PT session. Transfers:  Sit to Stand: Contact guard assistance  Functional Transfers  Bathroom Mobility:  (used BSC due to immed. need for BM)  Toilet Transfer : Minimum assistance (to and from [facility] placed next to chair. HHA and rollator)  Adaptive Equipment: Bedside commode;Walker (comment) (pt's rollator)     Balance:  Sitting: Intact; Without support  Standing: Intact; With support  Standing - Static: Good;Constant support  Standing - Dynamic :  (unable to bend/reach to floor in sitting nor standing due to pain and recent kyphoplasty)     ADL Intervention:  Feeding  Food to Mouth:  (daughter reports family has been feeding pt due to weakness)  Drink to Mouth: Supervision/set-up; Stand-by assistance--using water pitcher                 Lower Body Bathing  Perineal  : Moderate assistance;Maximum assistance  Position Performed: Standing  Cues: Physical assistance pants down;Physical assistance pants up; Tactile cues provided;Verbal cues provided  Adaptive Equipment: Wipes(personal cleansing cloth)  Position Performed: Standing  Adaptive Equipment: Other (comment) (standing holding onto rollator for support)     Upper Body Dressing Assistance  Dressing Assistance: Minimum assistance  Hospital Gown: Minimum  assistance     Lower Body Dressing Assistance  Dressing Assistance: Maximum assistance; Total assistance(dependent)  Protective Undergarmet: Total assistance (dependent)  Socks: Total assistance (dependent)  Leg Crossed Method Used: No (unable due to back pain)  Position Performed: Seated in chair  Cues:  (educated pt on adaptive tech and avail. of adaptive aids)  Adaptive Equipment Used:  (none--educ, on availability, question pt's capacity to learn)     Toileting  Toileting Assistance: Maximum assistance  Bladder Hygiene: Supervision/set-up; Minimum assistance  Bowel Hygiene: Maximum assistance  Clothing Management: Maximum assistance  Cues: Physical assistance for pants down;Physical assistance for pants up; Tactile cues provided;Verbal cues provided  Adaptive Equipment: Matt Clinton (for support in standing, pt using own rollator, locked brake)     Cognitive Retraining  Cues:  (memory loss at baseline due to dementia at baseline) Question if pt able to learn new things based on impaired memory at baseline. Therapeutic Exercises:   Encouraged pt to use the water pitcher as resistance for biceps curls. Pt verbalized understanding, but due to dementia will need assistance initiating this.   Pain:  Pain Scale 1: Numeric (0 - 10)  Pain Intensity 1: 9  Pain Location 1: Back  Pain Orientation 1: Lower  Pain Description 1: Aching;Constant  Pain Intervention(s) 1: Nurse notified  Activity Tolerance:   Good, able to stand, transfer and ambulate despite reported 9/10 pain. Please refer to the flowsheet for vital signs taken during this treatment.   O2 sats were 93% on room air, HR in 80s--seated post activity  After treatment:   [X] Patient left in no apparent distress sitting up in chair  [ ] Patient left in no apparent distress in bed  [X] Call bell left within reach  [X] Nursing notified  [X] Caregiver present  [ ] Bed alarm activated      COMMUNICATION/COLLABORATION:   The patients plan of care was discussed with: Physical Therapy Assistant and Registered Nurse     Janette Foreman OTR/L  Time Calculation: 51 mins

## 2017-06-01 NOTE — PROGRESS NOTES
End of Shift Nursing Note    Bedside shift change report given to Osito Mariee (oncoming nurse) by Tom Castaneda (offgoing nurse). Report included the following information SBAR, Kardex, Procedure Summary, Intake/Output, MAR, Recent Results and Med Rec Status. Significant changes during shift:    none   Non-emergent issues for physician to address:   none     Number times ambulated in hallway past shift: 0      Number of times OOB to chair past shift: 0       Vital Signs:    Temp: 98 °F (36.7 °C)     Pulse (Heart Rate): 65     BP: 110/45     Resp Rate: 20     O2 Sat (%): 95 %    Lines & Drains:     Urinary Catheter? No   Placement Date:    Medical Necessity:   Central Line? No   Placement Date:    Medical Necessity:   PICC Line? No   Placement Date:    Medical Necessity:     NG tube [] in [] removed [x] not applicable   Drains [] in [] removed [x] not applicable     Skin Integrity:      Wounds: no   Dressings Present: yes    Wound Concerns: no      GI:    Current diet:  DIET NUTRITIONAL SUPPLEMENTS Breakfast, Lunch; Ensure Clear  DIET NUTRITIONAL SUPPLEMENTS Dinner, AM Snack; Magic Cups  DIET NUTRITIONAL SUPPLEMENTS Lunch; Pudding, Fortified  DIET REGULAR    Nausea: NO  Vomiting: NO  Bowel Sounds: YES  Flatus: YES  Last Bowel Movement: several days ago       Respiratory:  Supplemental O2: Yes      Device: nasal cannula   via 2 Liters/min     Incentive Spirometer: YES    Coughing and Deep Breathing: YES  Oral Care: YES  Understanding (patient/family education): YES   Getting out of bed: YES  Head of bed elevation: YES    Patient Safety:    Falls Score: 5  Mobility Score:2  Sitter? No      Opportunity for questions and clarification was given to oncoming nurse. Patient bed is in lowest position, side rails are up x 2, door & observation blinds open as needed, call bell within reach and patient not in distress.     Deon Rendon, RN

## 2017-06-02 ENCOUNTER — APPOINTMENT (OUTPATIENT)
Dept: CT IMAGING | Age: 82
DRG: 853 | End: 2017-06-02
Attending: HOSPITALIST
Payer: MEDICARE

## 2017-06-02 LAB
ANION GAP BLD CALC-SCNC: 4 MMOL/L (ref 5–15)
BASOPHILS # BLD AUTO: 0 K/UL
BASOPHILS # BLD: 0 %
BUN SERPL-MCNC: 18 MG/DL (ref 6–20)
BUN/CREAT SERPL: 20 (ref 12–20)
CALCIUM SERPL-MCNC: 7.3 MG/DL (ref 8.5–10.1)
CHLORIDE SERPL-SCNC: 106 MMOL/L (ref 97–108)
CO2 SERPL-SCNC: 28 MMOL/L (ref 21–32)
CREAT SERPL-MCNC: 0.89 MG/DL (ref 0.55–1.02)
DIFFERENTIAL METHOD BLD: ABNORMAL
EOSINOPHIL # BLD: 0.2 K/UL
EOSINOPHIL NFR BLD: 2 %
ERYTHROCYTE [DISTWIDTH] IN BLOOD BY AUTOMATED COUNT: 15.7 % (ref 11.5–14.5)
GLUCOSE SERPL-MCNC: 91 MG/DL (ref 65–100)
HCT VFR BLD AUTO: 40.8 % (ref 35–47)
HGB BLD-MCNC: 13.7 G/DL (ref 11.5–16)
LYMPHOCYTES # BLD AUTO: 25 %
LYMPHOCYTES # BLD: 2.3 K/UL
MCH RBC QN AUTO: 28.9 PG (ref 26–34)
MCHC RBC AUTO-ENTMCNC: 33.6 G/DL (ref 30–36.5)
MCV RBC AUTO: 86.1 FL (ref 80–99)
MONOCYTES # BLD: 0.6 K/UL
MONOCYTES NFR BLD AUTO: 7 %
NEUTS SEG # BLD: 6.1 K/UL
NEUTS SEG NFR BLD AUTO: 66 %
PLATELET # BLD AUTO: 176 K/UL (ref 150–400)
POTASSIUM SERPL-SCNC: 3.4 MMOL/L (ref 3.5–5.1)
RBC # BLD AUTO: 4.74 M/UL (ref 3.8–5.2)
RBC MORPH BLD: ABNORMAL
SODIUM SERPL-SCNC: 138 MMOL/L (ref 136–145)
WBC # BLD AUTO: 9.2 K/UL (ref 3.6–11)

## 2017-06-02 PROCEDURE — 85025 COMPLETE CBC W/AUTO DIFF WBC: CPT | Performed by: HOSPITALIST

## 2017-06-02 PROCEDURE — 80048 BASIC METABOLIC PNL TOTAL CA: CPT | Performed by: HOSPITALIST

## 2017-06-02 PROCEDURE — 74011250637 HC RX REV CODE- 250/637: Performed by: HOSPITALIST

## 2017-06-02 PROCEDURE — 97116 GAIT TRAINING THERAPY: CPT

## 2017-06-02 PROCEDURE — 65270000029 HC RM PRIVATE

## 2017-06-02 PROCEDURE — 74011000255 HC RX REV CODE- 255: Performed by: HOSPITALIST

## 2017-06-02 PROCEDURE — 36415 COLL VENOUS BLD VENIPUNCTURE: CPT | Performed by: HOSPITALIST

## 2017-06-02 PROCEDURE — 74011250637 HC RX REV CODE- 250/637: Performed by: INTERNAL MEDICINE

## 2017-06-02 PROCEDURE — 74176 CT ABD & PELVIS W/O CONTRAST: CPT

## 2017-06-02 PROCEDURE — 97530 THERAPEUTIC ACTIVITIES: CPT

## 2017-06-02 PROCEDURE — 74011250636 HC RX REV CODE- 250/636: Performed by: INTERNAL MEDICINE

## 2017-06-02 RX ORDER — ASPIRIN 81 MG/1
81 TABLET ORAL DAILY
Qty: 30 TAB | Refills: 1 | Status: SHIPPED | OUTPATIENT
Start: 2017-06-02

## 2017-06-02 RX ORDER — POTASSIUM CHLORIDE 750 MG/1
40 TABLET, FILM COATED, EXTENDED RELEASE ORAL
Status: COMPLETED | OUTPATIENT
Start: 2017-06-02 | End: 2017-06-02

## 2017-06-02 RX ORDER — OXYCODONE AND ACETAMINOPHEN 5; 325 MG/1; MG/1
1-2 TABLET ORAL
Qty: 20 TAB | Refills: 0 | Status: SHIPPED | OUTPATIENT
Start: 2017-06-02

## 2017-06-02 RX ORDER — BARIUM SULFATE 20 MG/ML
900 SUSPENSION ORAL
Status: COMPLETED | OUTPATIENT
Start: 2017-06-02 | End: 2017-06-02

## 2017-06-02 RX ORDER — CEPHALEXIN 500 MG/1
500 CAPSULE ORAL 2 TIMES DAILY
Qty: 6 CAP | Refills: 0 | Status: SHIPPED | OUTPATIENT
Start: 2017-06-02 | End: 2017-06-05

## 2017-06-02 RX ADMIN — Medication 1 CAPSULE: at 08:56

## 2017-06-02 RX ADMIN — ENOXAPARIN SODIUM 30 MG: 60 INJECTION SUBCUTANEOUS at 18:42

## 2017-06-02 RX ADMIN — Medication 10 ML: at 13:00

## 2017-06-02 RX ADMIN — AMLODIPINE BESYLATE 5 MG: 5 TABLET ORAL at 08:57

## 2017-06-02 RX ADMIN — CEPHALEXIN 500 MG: 250 CAPSULE ORAL at 08:57

## 2017-06-02 RX ADMIN — OXYCODONE HYDROCHLORIDE AND ACETAMINOPHEN 1 TABLET: 5; 325 TABLET ORAL at 15:22

## 2017-06-02 RX ADMIN — ESCITALOPRAM OXALATE 10 MG: 10 TABLET ORAL at 08:56

## 2017-06-02 RX ADMIN — MEMANTINE 5 MG: 10 TABLET ORAL at 08:56

## 2017-06-02 RX ADMIN — ASPIRIN 81 MG: 81 TABLET, COATED ORAL at 08:56

## 2017-06-02 RX ADMIN — OXYCODONE HYDROCHLORIDE AND ACETAMINOPHEN 1 TABLET: 5; 325 TABLET ORAL at 12:57

## 2017-06-02 RX ADMIN — OXYCODONE HYDROCHLORIDE AND ACETAMINOPHEN 2 TABLET: 5; 325 TABLET ORAL at 18:42

## 2017-06-02 RX ADMIN — CEPHALEXIN 500 MG: 250 CAPSULE ORAL at 20:30

## 2017-06-02 RX ADMIN — POTASSIUM CHLORIDE 40 MEQ: 750 TABLET, FILM COATED, EXTENDED RELEASE ORAL at 15:22

## 2017-06-02 RX ADMIN — BARIUM SULFATE 900 ML: 21 SUSPENSION ORAL at 09:13

## 2017-06-02 RX ADMIN — Medication 10 ML: at 20:32

## 2017-06-02 RX ADMIN — DONEPEZIL HYDROCHLORIDE 10 MG: 5 TABLET, FILM COATED ORAL at 22:38

## 2017-06-02 RX ADMIN — OXYCODONE HYDROCHLORIDE AND ACETAMINOPHEN 1 TABLET: 5; 325 TABLET ORAL at 08:57

## 2017-06-02 NOTE — PROGRESS NOTES
Problem: Mobility Impaired (Adult and Pediatric)  Goal: *Acute Goals and Plan of Care (Insert Text)  Physical Therapy Goals  Initiated 5/29/2017  1. Patient will move from supine to sit and sit to supine , scoot up and down and roll side to side in bed with modified independence within 7 day(s). 2. Patient will transfer from bed to chair and chair to bed with modified independence using the least restrictive device within 7 day(s). 3. Patient will perform sit to stand with modified independence within 7 day(s). 4. Patient will ambulate with supervision/set-up for 150 feet with the least restrictive device within 7 day(s). .   PHYSICAL THERAPY TREATMENT  Patient: Matilde Morrison (20 y.o. female)  Date: 6/2/2017  Diagnosis: UTI (urinary tract infection) Sepsis, Gram negative ([facility] [address]       Precautions: Fall, DNR  Chart, physical therapy assessment, plan of care and goals were reviewed. ASSESSMENT:pt tolerated tx very well today, less pain today, no LOB or SOB, did well with toilet transfers and bed mob, vc's for safety and proper Rollator use. Progression toward goals:  [ ]      Improving appropriately and progressing toward goals  [X]      Improving slowly and progressing toward goals  [ ]      Not making progress toward goals and plan of care will be adjusted       PLAN:  Patient continues to benefit from skilled intervention to address the above impairments. Continue treatment per established plan of care.   Discharge Recommendations:  [facility])  Further Equipment Recommendations for Discharge:  Has rollator       OBJECTIVE DATA SUMMARY:      Critical Behavior:  Neurologic State: Alert  Orientation Level: Appropriate for age, Oriented to situation, Oriented to place, Oriented to person  Cognition: Follows commands  Safety/Judgement: Decreased insight into deficits      Functional Mobility Training:  Bed Mobility:  Rolling: Minimum assistance;Assist x1;Additional time  Supine to Sit: Minimum assistance;Assist x1;Additional time  Scooting: Minimum assistance;Assist x1;Additional time  Level of Assistance: Minimum assistance              Interventions: Tactile cues; Verbal cues      Transfers:  Sit to Stand: Contact guard assistance  Stand to Sit: Contact guard assistance  Bed to Chair: Contact guard assistance  Interventions: Verbal cues; Tactile cues  Level of Assistance: Contact guard assistance      Balance:  Sitting: Intact; Without support  Standing: Intact; With support  Standing - Static: Good;Constant support  Standing - Dynamic : Good      Ambulation/Gait Training:  Distance (ft): 140 Feet (ft)  Assistive Device: Gait belt;Walker, rollator  Ambulation - Level of Assistance: Contact guard assistance  Gait Abnormalities: Decreased step clearance  Right Side Weight Bearing: Full  Left Side Weight Bearing: Full  Base of Support: Narrowed  Speed/Jie: Pace decreased (<100 feet/min)  Step Length: Left shortened;Right shortened     Pain:  Pain Scale 1: Numeric (0 - 10)  Pain Intensity 1: 6  Pain Location 1: Abdomen;Back  Pain Orientation 1: Lower  Pain Description 1: Aching  Pain Intervention(s) 1: Medication (see MAR)      Activity Tolerance: fair     After treatment:   [X] Patient left in no apparent distress sitting up in chair  [ ] Patient left in no apparent distress in bed  [X] Call bell left within reach  [X] Nursing notified  [X] Caregiver present  [ ] Bed alarm activated      COMMUNICATION/COLLABORATION:   The patients plan of care was discussed with: Registered Nurse     Adam De La Garza PTA   Time Calculation: 25 mins

## 2017-06-02 NOTE — PROGRESS NOTES
End of Shift Nursing Note    Bedside shift change report given to Mis Beaulieu RN (oncoming nurse) by Magui Dubois RN  (offgoing nurse). Report included the following information SBAR, Kardex, Intake/Output, MAR and Recent Results. Significant changes during shift:    none   Non-emergent issues for physician to address:   none     Number times ambulated in hallway past shift: 0      Number of times OOB to chair past shift: 0         Vital Signs:    Temp: 96.7 °F (35.9 °C)     Pulse (Heart Rate): 62     BP: 130/45     Resp Rate: 15     O2 Sat (%): 92 %    Lines & Drains:     Urinary Catheter? No   Placement Date:    Medical Necessity:   Central Line? No   Placement Date:    Medical Necessity:   PICC Line? No   Placement Date:    Medical Necessity:     NG tube [] in [] removed [x] not applicable   Drains [] in [] removed [x] not applicable     Skin Integrity:      Wounds: no    Dressings Present: no    Wound Concerns: no      GI:    Current diet:  DIET NUTRITIONAL SUPPLEMENTS Breakfast, Lunch; Ensure Clear  DIET NUTRITIONAL SUPPLEMENTS Dinner, AM Snack; Magic Cups  DIET NUTRITIONAL SUPPLEMENTS Lunch; Pudding, Fortified  DIET DYSPHAGIA MECH ALTERED (NDD2)    Nausea: NO  Vomiting: NO  Bowel Sounds: YES  Flatus: YES  Last Bowel Movement: yesterday   Appearance: soft    Respiratory:  Supplemental O2: No      Device:    via  Liters/min     Incentive Spirometer: YES   Coughing and Deep Breathing: YES  Oral Care: YES  Understanding (patient/family education): YES   Getting out of bed: NO  Head of bed elevation: YES    Patient Safety:    Falls Score: 4  Mobility Score: 2  Bed Alarm On? Yes  Sitter? No      Opportunity for questions and clarification was given to oncoming nurse. Patient bed is in lowest position, side rails are up x 2, door & observation blinds open as needed, call bell within reach and patient not in distress.     Flor Esposito RN

## 2017-06-02 NOTE — PROGRESS NOTES
Hospitalist Progress Note    NAME: Charla Perez   :  1935   MRN:  [id number]       Interim Hospital Summary: [de-identified] y.o. female whom presented on 2017 with NEIL     Assessment / Plan:  Abdominal pain   Sacral pain    S/p kyphoplasty on   Now with worsening pain more in the left lower quadrant and  Sacrum  If diarrhea concerning, send for c diff  Also pt with osteoporosis,  Was on vitamin D and ca in the past, was stopped in the interim  Pain management, check CT abd/pelvis including sacrum with oral contrast, labs today  Pt/ot      NEIL secondary to dehydration with underlying dementia  Encephalopathy with above  Gram neg bacteremia probably secondary to UTI  Leucocytosis ( no sepsis)    Ceftriaxone d6, changed to keflex to complete the course  Blood  Cultures on  with gram neg rods, normally would not repeat with gram neg bacteremia  Urine cultures with >100, 000 colonies ( contamination as well)  CT head neg     Hypokalemia  replace      Acute L5 compression fracture  MRI: Moderate L5 compression fracture, relatively acute. No associated spinal canal compromise. Pain management  S/p kyphoplasty, seen by PT,   Other plan as above    Elevated troponin flat  ekg no ischemic changes, no chest pain  Probably supply demand mismatch    Dementia  Living in [facility] independent living  Needs assisted living, needing more care recently per the daughter. Cont namenda      Body mass index is 23.76 kg/(m^2). Code status: dnr  Prophylaxis: lovenox  Recommended Disposition:tbd     Subjective:     Chief Complaint / Reason for Physician Visit  Follow up on dementia, compression fracture and uti  More pain in the left lower quadrant and sacrum  Discussed with RN events overnight.      Review of Systems:  Symptom Y/N Comments  Symptom Y/N Comments   Fever/Chills n   Chest Pain n    Poor Appetite    Edema     Cough n   Abdominal Pain n    Sputum    Joint Pain     SOB/MOTT n   Pruritis/Rash Nausea/vomit    Tolerating PT/OT     Diarrhea    Tolerating Diet     Constipation    Other       Could NOT obtain due to:      Objective:     VITALS:   Last 24hrs VS reviewed since prior progress note. Most recent are:  Patient Vitals for the past 24 hrs:   Temp Pulse Resp BP SpO2   06/02/17 0748 97.6 °F (36.4 °C) 60 15 129/55 93 %   06/02/17 0438 96.7 °F (35.9 °C) 62 15 130/45 92 %   06/01/17 2018 98.2 °F (36.8 °C) 64 16 103/43 92 %   06/01/17 1820 98.3 °F (36.8 °C) 68 16 117/56 90 %   06/01/17 1237 98 °F (36.7 °C) 63 16 133/57 95 %       Intake/Output Summary (Last 24 hours) at 06/02/17 0943  Last data filed at 06/01/17 1550   Gross per 24 hour   Intake              500 ml   Output                3 ml   Net              497 ml        PHYSICAL EXAM:  General: WD, WN. Alert,  cooperative,   no acute distress    EENT:  EOMI. Anicteric sclerae. MMM  Resp:  B/l air entry No accessory muscle use  CV:  Regular  rhythm,  No edema  GI:  Soft, Non distended, left lower quadrant and scaral tenderness +.  +Bowel sounds  Neurologic:  Alert and oriented X 3, normal speech,   Psych:   Good insight. Not anxious nor agitated  Skin:  No rashes. No jaundice    Reviewed most current lab test results and cultures  YES  Reviewed most current radiology test results   YES  Review and summation of old records today    NO  Reviewed patient's current orders and MAR    YES  PMH/ reviewed - no change compared to H&P  ________________________________________________________________________  Care Plan discussed with:    Comments   Patient y    Family  y    RN y    Care Manager     Consultant                        Multidiciplinary team rounds were held today with , nursing, pharmacist and clinical coordinator. Patient's plan of care was discussed; medications were reviewed and discharge planning was addressed.      ________________________________________________________________________  Total NON critical care TIME: 25 Minutes    Total CRITICAL CARE TIME Spent:   Minutes non procedure based      Comments   >50% of visit spent in counseling and coordination of care     ________________________________________________________________________  Tegan Brady MD     Procedures: see electronic medical records for all procedures/Xrays and details which were not copied into this note but were reviewed prior to creation of Plan. LABS:  I reviewed today's most current labs and imaging studies.   Pertinent labs include:  Recent Labs      05/30/17   1329   WBC  9.6   HGB  15.0   HCT  44.2   PLT  131*     Recent Labs      06/01/17   0250  05/30/17   1329   NA  145  143   K  3.2*  2.8*   CL  110*  109*   CO2  28  25   GLU  68  123*   BUN  23*  22*   CREA  0.90  1.15*   CA  7.6*  7.5*       Signed: Tegan Brady MD

## 2017-06-02 NOTE — PROGRESS NOTES
Pt is on the list for weekend admission to [facility]. If pt discharges this weekend please contact Sai Kimble [phone]. Care Management Interventions  PCP Verified by CM: Yes  Mode of Transport at Discharge: Other (see comment) (Pt may need transport at discharge, TBD closer to discharge)  Transition of Care Consult (CM Consult): SNF ([facility] )  Partner SNF: Yes  Physical Therapy Consult: Yes  Occupational Therapy Consult: Yes  Current Support Network:  Other (Pt lives in independent living at [facility])  Confirm Follow Up Transport: Family (Pt family assists with transportation to her appointments)  Plan discussed with Pt/Family/Caregiver: Yes  Freedom of Choice Offered: Yes  Discharge Location  Discharge Placement: Skilled nursing facility [facility])    AMAN Sevilla, [address] [place] [address]   [phone]

## 2017-06-03 VITALS
OXYGEN SATURATION: 95 % | WEIGHT: 145 LBS | BODY MASS INDEX: 24.75 KG/M2 | SYSTOLIC BLOOD PRESSURE: 140 MMHG | DIASTOLIC BLOOD PRESSURE: 82 MMHG | RESPIRATION RATE: 16 BRPM | HEART RATE: 64 BPM | HEIGHT: 64 IN | TEMPERATURE: 98.1 F

## 2017-06-03 LAB
ALBUMIN SERPL BCP-MCNC: 1.8 G/DL (ref 3.5–5)
ALBUMIN/GLOB SERPL: 0.4 {RATIO} (ref 1.1–2.2)
ALP SERPL-CCNC: 340 U/L (ref 45–117)
ALT SERPL-CCNC: 60 U/L (ref 12–78)
AMMONIA PLAS-SCNC: 17 UMOL/L
ANION GAP BLD CALC-SCNC: 6 MMOL/L (ref 5–15)
AST SERPL W P-5'-P-CCNC: 102 U/L (ref 15–37)
BILIRUB SERPL-MCNC: 1.3 MG/DL (ref 0.2–1)
BUN SERPL-MCNC: 11 MG/DL (ref 6–20)
BUN/CREAT SERPL: 15 (ref 12–20)
CALCIUM SERPL-MCNC: 7.6 MG/DL (ref 8.5–10.1)
CHLORIDE SERPL-SCNC: 108 MMOL/L (ref 97–108)
CO2 SERPL-SCNC: 25 MMOL/L (ref 21–32)
CREAT SERPL-MCNC: 0.75 MG/DL (ref 0.55–1.02)
GLOBULIN SER CALC-MCNC: 4.7 G/DL (ref 2–4)
GLUCOSE SERPL-MCNC: 102 MG/DL (ref 65–100)
INR PPP: 1.1 (ref 0.9–1.1)
LIPASE SERPL-CCNC: 180 U/L (ref 73–393)
POTASSIUM SERPL-SCNC: 3.9 MMOL/L (ref 3.5–5.1)
PROT SERPL-MCNC: 6.5 G/DL (ref 6.4–8.2)
PROTHROMBIN TIME: 11.5 SEC (ref 9–11.1)
SODIUM SERPL-SCNC: 139 MMOL/L (ref 136–145)

## 2017-06-03 PROCEDURE — 36415 COLL VENOUS BLD VENIPUNCTURE: CPT | Performed by: HOSPITALIST

## 2017-06-03 PROCEDURE — 74011250637 HC RX REV CODE- 250/637: Performed by: INTERNAL MEDICINE

## 2017-06-03 PROCEDURE — 74011250637 HC RX REV CODE- 250/637: Performed by: HOSPITALIST

## 2017-06-03 PROCEDURE — 80074 ACUTE HEPATITIS PANEL: CPT | Performed by: HOSPITALIST

## 2017-06-03 PROCEDURE — 83690 ASSAY OF LIPASE: CPT | Performed by: HOSPITALIST

## 2017-06-03 PROCEDURE — 80053 COMPREHEN METABOLIC PANEL: CPT | Performed by: HOSPITALIST

## 2017-06-03 PROCEDURE — 82140 ASSAY OF AMMONIA: CPT | Performed by: HOSPITALIST

## 2017-06-03 PROCEDURE — 85610 PROTHROMBIN TIME: CPT | Performed by: HOSPITALIST

## 2017-06-03 RX ORDER — PANTOPRAZOLE SODIUM 40 MG/1
40 TABLET, DELAYED RELEASE ORAL
Status: DISCONTINUED | OUTPATIENT
Start: 2017-06-04 | End: 2017-06-03 | Stop reason: HOSPADM

## 2017-06-03 RX ADMIN — Medication 1 CAPSULE: at 09:02

## 2017-06-03 RX ADMIN — OXYCODONE HYDROCHLORIDE AND ACETAMINOPHEN 2 TABLET: 5; 325 TABLET ORAL at 13:08

## 2017-06-03 RX ADMIN — MEMANTINE 5 MG: 10 TABLET ORAL at 09:02

## 2017-06-03 RX ADMIN — CEPHALEXIN 500 MG: 250 CAPSULE ORAL at 09:03

## 2017-06-03 RX ADMIN — OXYCODONE HYDROCHLORIDE AND ACETAMINOPHEN 2 TABLET: 5; 325 TABLET ORAL at 09:02

## 2017-06-03 RX ADMIN — Medication 10 ML: at 06:41

## 2017-06-03 RX ADMIN — AMLODIPINE BESYLATE 5 MG: 5 TABLET ORAL at 09:02

## 2017-06-03 RX ADMIN — ESCITALOPRAM OXALATE 10 MG: 10 TABLET ORAL at 09:02

## 2017-06-03 RX ADMIN — ASPIRIN 81 MG: 81 TABLET, COATED ORAL at 09:02

## 2017-06-03 NOTE — PROGRESS NOTES
Spiritual Care Assessment/Progress Notes    Dov Allen [id number]  [id number]    1935  [de-identified] y.o.  female    Patient Telephone Number: [phone] (home)   Temple Affiliation: Rona Jonas   Language: English   Extended Emergency Contact Information  Primary Emergency Contact: [address]  Phone: [phone]  Mobile Phone: [phone]  Relation: Daughter  Secondary Emergency Contact: [address] [place] Phone: [phone]  Relation: Child   Patient Active Problem List    Diagnosis Date Noted    Sepsis, Gram negative ([facility] [address] 05/29/2017     Class: Acute    Altered mental status 05/28/2017     Class: Acute    Acute renal injury ([facility] [address] 05/28/2017     Class: Acute    Fall at home 05/28/2017     Class: Present on Admission    Elevated troponin 05/28/2017     Class: Present on Admission    Closed compression fracture of lumbosacral spine ([facility] [address] 05/28/2017     Class: Present on Admission    UTI (urinary tract infection) 05/27/2017    Depression 01/13/2017    Diarrhea 01/13/2017    HTN (hypertension) 11/30/2015    Dementia 11/30/2015    Mixed incontinence urge and stress 08/17/2011        Date: 6/3/2017       Level of Temple/Spiritual Activity:  []         Involved in kaelyn tradition/spiritual practice    []         Not involved in kaelyn tradition/spiritual practice  [x]         Spiritually oriented    []         Claims no spiritual orientation    []         seeking spiritual identity  []         Feels alienated from Christianity practice/tradition  []         Feels angry about Christianity practice/tradition  [x]         Spirituality/Christianity tradition is a resource for coping at this time.   []         Not able to assess due to medical condition    Services Provided Today:  []         crisis intervention    []         reading Scriptures  [x]         spiritual assessment    [x]         prayer  [x]         empathic listening/emotional support []         rites and rituals (cite in comments)  []         life review     []         Alevism support  []         theological development    []         advocacy  []         ethical dialog     []         blessing  []         bereavement support    [x]         support to family  []         anticipatory grief support   []         help with AMD  []         spiritual guidance    []         meditation      Spiritual Care Needs  [x]         Emotional Support  [x]         Spiritual/Advent Care  []         Loss/Adjustment  []         Advocacy/Referral                /Ethics  []         No needs expressed at               this time  []         Other: (note in               comments)  [address]  []         Follow up visits with               pt/family  []         Provide materials  []         Schedule sacraments  []         Contact Community               Clergy  [x]         Follow up as needed  []         Other: (note in               comments)     Comments:   Initial visit on Gen Surg for spiritual assessment. Patient's daughter, Jeannie Otto, was present. Patient has another daughter, but she is only minimally involved in patient's life. Patient reached for my hand as I approached bedside. Provided spiritual presence and listening support as she asked her daughter to provide update about her medical challenges. Patient requested prayer which was offered at bedside. Advised them of  availability for patient/family support.    FADIA Kern, [address] [facility], [address] [place] [address] [address]     Paging Service  [phone]

## 2017-06-03 NOTE — PROGRESS NOTES
Patient discharged via [facility]. Patients family was given all discharge paperwork as well as hard copy prescriptions to give to the nurse at the receiving facility. Patient had all belongings. Patient was in no distress. Transporters were made aware that patient was a DNR.

## 2017-06-03 NOTE — DISCHARGE SUMMARY
Hospitalist Discharge Summary     Patient ID:  Alfonso Acosta  [id number]  89 y.o.  1935    PCP on record: Santiago Mccartney III, DO    Admit date: 5/27/2017  Discharge date and time: 6/3/2017      DISCHARGE DIAGNOSIS:    Gram neg bacteremia  uti  Sepsis  josseline        CONSULTATIONS:  IP CONSULT TO INTERVENTIONAL RADIOLOGY    Excerpted HPI from H&P of Anne Sol MD:  Jennifer Fay is a [de-identified] y.o. female who presents with the above complaint and the below Pmhx. The patient's daughter notes that she checks in on the pt on a daily basis. She reports that the pt fell on 5/23/17 and started c/o the left lower back pain. She states that no one witnessed the fall, and both the daughter and the pt are unsure of the time that the pt was down for. She notes that she has been giving the pt Tramadol and Meloxicam for her pain, and using a walker since the fall. Per the daughter the patient has been talking less since the confusion began. \"She just really is not connecting. \" At baseline the patient knows the place but not the time. No cough, she was last on abx on April 30th of a uti. No new GI symptoms. She does not remember the fall but does not complain of any headaches. She has been complaining of L sided lower back pain since the fall. We were asked to admit for work up and evaluation of the above problems.        ______________________________________________________________________  DISCHARGE SUMMARY/HOSPITAL COURSE:  for full details see H&P, daily progress notes, labs, consult notes. Pt with dementia, declining in the last couple of months, needing more assistance for ADLs  With ? Osteoporosis ( last dexa couple of years ago with osteopenia, has been following with pcp has been on vitamin D  And calcium, per the daughter, was not tolerating  Does have gi issues, sees dr. Brandee Velarde ?  IBS    Had a fall on 5/23 with c/o back pain, was given pain meds, since then using a walker    Presented with encephalopathy,   Sepsis and gram neg bacteremia presumed uti as the possiblity, complete the treatment  Work up revealed with gram neg bacteremia, ( one bottle) and urine cultures with > [id number] colonies > 2 organisms  Complete the course of keflex, cont probiotic    S/p L5 kyphoplasty, seen by PT, plan for snf  Dementia: cont home meds    Complaining of sacral and inguinal pain    Got the ct abd/pelvis-- no sacral fracture  Now incidentally noted cirrhosis portal hypertension    Spoke with the family at the bedside,, further work up as out patient, does follow up with dr. Hai Shukla, need to see in a week  May need endoscopy  No abdominal pain, no fever, no concern for sbp  Cont protonix  alergies to betablocker, could not start on nadolol  Defer further work up for cirrhosis to gi. Given steady decline with her dementia, weight loss  introduced  Referrals to hospice for information session    josseline resolved  hypokalemia resolved    Discharge to [facility]    _______________________________________________________________________  Patient seen and examined by me on discharge day. Pertinent Findings:  Gen:    Not in distress  Chest: Clear lungs  CVS:   Regular rhythm. No edema  Abd:  Soft, not distended, not tender  Neuro:  Alert,   _______________________________________________________________________  DISCHARGE MEDICATIONS:   Current Discharge Medication List      START taking these medications    Details   aspirin delayed-release 81 mg tablet Take 1 Tab by mouth daily. Qty: 30 Tab, Refills: 1      cephALEXin (KEFLEX) 500 mg capsule Take 1 Cap by mouth two (2) times a day for 3 days. Qty: 6 Cap, Refills: 0      L. acidoph & paracasei- S therm- Bifido (ISAAK-Q/RISAQUAD) 8 billion cell cap cap Take 1 Cap by mouth daily for 15 days. Qty: 15 Cap, Refills: 0      oxyCODONE-acetaminophen (PERCOCET) 5-325 mg per tablet Take 1-2 Tabs by mouth every three (3) hours as needed. Max Daily Amount: 16 Tabs.   Qty: 20 Tab, Refills: 0         CONTINUE these medications which have NOT CHANGED    Details   memantine (NAMENDA) 5 mg tablet TAKE 1 TABLET BY MOUTH DAILY. Qty: 30 Tab, Refills: 9      escitalopram oxalate (LEXAPRO) 10 mg tablet TAKE 1 TABLET BY MOUTH DAILY. Qty: 30 Tab, Refills: 9      amLODIPine (NORVASC) 5 mg tablet TAKE 1 TABLET BY MOUTH DAILY FOR BLOOD PRESSURE. Qty: 30 Tab, Refills: 9      donepezil (ARICEPT) 10 mg tablet TAKE 1 TABLET BY MOUTH EVERY NIGHT. Qty: 30 Tab, Refills: 6      eluxadoline (VIBERZI) 75 mg tablet Take 1 Tab by mouth two (2) times daily (with meals). Max Daily Amount: 150 mg.  Qty: 60 Tab, Refills: 6      fluorometholone (FML) 0.1 % ophthalmic suspension Administer 1 Drop to both eyes two (2) times a day. Walker (ULTRA-LIGHT ROLLATOR) misc Dx: F03.90 M19.90 M25.551  Qty: 1 Each, Refills: 0      omega-3 fatty acids-vitamin e 1,000 mg cap Take 1 Cap by mouth daily. CALCIUM CARBONATE (CALCIUM 500 PO) Take  by mouth daily. STOP taking these medications       traMADol (ULTRAM) 50 mg tablet Comments:   Reason for Stopping:         meloxicam (MOBIC) 15 mg tablet Comments:   Reason for Stopping:         ibuprofen (MOTRIN) 200 mg tablet Comments:   Reason for Stopping:               My Recommended Diet, Activity, Wound Care, and follow-up labs are listed in the patient's Discharge Insturctions which I have personally completed and reviewed.     _______________________________________________________________________  DISPOSITION:    Home with Family:    Home with HH/PT/OT/RN:    SNF/LTC: y   KOFI:    OTHER:        Condition at Discharge:  Stable  _______________________________________________________________________  Follow up with:   PCP : Lidia Contreras III, DO  Follow-up Information     Follow up With Details Comments Contact Info    Richard Eaton [facility]  This is your post acute care provider of choice  [address] [place] [address] [id number] [phone] Dylan Brown III,  In 1 week  Lilian [address]      Lester Kauffman MD In 1 week  [facility] [address]  [address] [facility]  [phone]                Total time in minutes spent coordinating this discharge (includes going over instructions, follow-up, prescriptions, and preparing report for sign off to her PCP) : 35 minutes    Signed:  Hai Andrea MD

## 2017-06-03 NOTE — PROGRESS NOTES
Pt to be dc to SNF. Spoke with Naz Huitron at [facility]. RN please call report to [phone] and ask for unit 1 nurses station. If ambulance is needed for transport, RN please arrange with [facility] at [phone]. Please fill out PCS form, print out code status, h&p and face sheet for [facility].     Yaritza Tijerina

## 2017-06-03 NOTE — PROGRESS NOTES
End of Shift Nursing Note    Bedside shift change report given to [address] [place] [address] (oncoming nurse) by Mitul Block (offgoing nurse). Report included the following information SBAR, Kardex, ED Summary, Procedure Summary, Intake/Output, MAR, Accordion and Recent Results    Significant changes during shift:    Pt inc of urine, pts stool now softly formed. Some confusion, bed alarm used but pt not trying to get OOB. Pain meds PRN. Non-emergent issues for physician to address:   none     Number times ambulated in hallway past shift: 0      Number of times OOB to chair past shift: 2    POD #: 2     Vital Signs:    Temp: 97.9 °F (36.6 °C)     Pulse (Heart Rate): 62     BP: 128/60     Resp Rate: 16     O2 Sat (%): 91 %    Lines & Drains:     Urinary Catheter? No       NG tube [] in [] removed [x] not applicable   Drains [] in [] removed [x] not applicable     Skin Integrity:      Wounds: yes   Dressings Present: yes    Wound Concerns: no      GI:    Current diet:  DIET NUTRITIONAL SUPPLEMENTS Breakfast, Lunch; Ensure Clear  DIET NUTRITIONAL SUPPLEMENTS Dinner, AM Snack; Magic Cups  DIET NUTRITIONAL SUPPLEMENTS Lunch; Pudding, Fortified  DIET DYSPHAGIA MECH ALTERED (NDD2)    Nausea: NO  Vomiting: NO  Bowel Sounds: YES  Flatus: YES  Last Bowel Movement: today   Appearance: softly formed, brown    Respiratory:  Supplemental O2: No        Incentive Spirometer: YES  Volume: 750  Coughing and Deep Breathing: YES  Oral Care: YES  Understanding (patient/family education): YES   Getting out of bed: YES  Head of bed elevation: YES    Patient Safety:    Falls Score: 5  Mobility Score: 1  Bed Alarm On? Yes  Sitter? No      Opportunity for questions and clarification was given to oncoming nurse. Patient bed is in low position, side rails are up x 2, door & observation blinds open as needed, call bell within reach and patient not in distress.     Cole Richardson RN

## 2017-06-03 NOTE — ROUTINE PROCESS
Report called in to [facility] regarding patient MAR, ASSESSMENT, KARDEX, LABS, SBAR. Opportunity given for questions and answers.

## 2017-06-05 ENCOUNTER — PATIENT OUTREACH (OUTPATIENT)
Dept: INTERNAL MEDICINE CLINIC | Age: 82
End: 2017-06-05

## 2017-06-05 LAB
BACTERIA SPEC CULT: NORMAL
BACTERIA SPEC CULT: NORMAL
HAV IGM SERPL QL IA: NONREACTIVE
HBV CORE IGM SER QL: NONREACTIVE
HBV SURFACE AG SER QL: <0.1 INDEX
HBV SURFACE AG SER QL: NEGATIVE
HCV AB SERPL QL IA: NONREACTIVE
HCV COMMENT,HCGAC: NORMAL
SERVICE CMNT-IMP: NORMAL
SERVICE CMNT-IMP: NORMAL
SP1: NORMAL
SP2: NORMAL
SP3: NORMAL

## 2017-06-05 NOTE — PROGRESS NOTES
[facility] Hospitalization             Referral from [facility]; [facility]. Patient admitted to [facility] on 5/27/17 and discharged on 6/3/17 with diagnosis of Gram neg bacteremia probably secondary to UTI, NEIL secondary to dehydration with underlying dementia, acute L5 compression fracture. - s/p kyphoplasty on 5/31/17. Significant Lab/Diagnostic Findings:  Lab Results  Component Value Date/Time   WBC 9.2 06/02/2017 09:55 AM   HGB 13.7 06/02/2017 09:55 AM   HCT 40.8 06/02/2017 09:55 AM   PLATELET 912 13/06/7525 09:55 AM   MCV 86.1 06/02/2017 09:55 AM       Lab Results  Component Value Date/Time   ALT (SGPT) 60 06/03/2017 09:35 AM   AST (SGOT) 102 06/03/2017 09:35 AM   Alk.  phosphatase 340 06/03/2017 09:35 AM   Bilirubin, total 1.3 06/03/2017 09:35 AM       Lab Results   Component Value Date/Time    INR 1.1 06/03/2017 09:35 AM    Prothrombin time 11.5 06/03/2017 09:35 AM      Lab Results  Component Value Date/Time   GFR est AA >60 06/03/2017 09:35 AM   GFR est non-AA >60 06/03/2017 09:35 AM   Creatinine 0.75 06/03/2017 09:35 AM   BUN 11 06/03/2017 09:35 AM   Sodium 139 06/03/2017 09:35 AM   Potassium 3.9 06/03/2017 09:35 AM   Chloride 108 06/03/2017 09:35 AM   CO2 25 06/03/2017 09:35 AM      Lab Results   Component Value Date/Time    CK 39 05/27/2017 02:48 PM    CK - MB <0.5 09/25/2016 07:01 PM    CK-MB Index CANNOT BE CALCULATED 09/25/2016 07:01 PM    Troponin-I, Qt. 0.11 05/28/2017 03:23 AM      Lab Results   Component Value Date/Time    Sodium 139 06/03/2017 09:35 AM    Potassium 3.9 06/03/2017 09:35 AM    Chloride 108 06/03/2017 09:35 AM    CO2 25 06/03/2017 09:35 AM    Anion gap 6 06/03/2017 09:35 AM    Glucose 102 06/03/2017 09:35 AM    BUN 11 06/03/2017 09:35 AM    Creatinine 0.75 06/03/2017 09:35 AM    BUN/Creatinine ratio 15 06/03/2017 09:35 AM    GFR est AA >60 06/03/2017 09:35 AM    GFR est non-AA >60 06/03/2017 09:35 AM    Calcium 7.6 06/03/2017 09:35 AM      Lab Results   Component Value Date/Time    Sodium 139 06/03/2017 09:35 AM    Potassium 3.9 06/03/2017 09:35 AM    Chloride 108 06/03/2017 09:35 AM    CO2 25 06/03/2017 09:35 AM    Anion gap 6 06/03/2017 09:35 AM    Glucose 102 06/03/2017 09:35 AM    BUN 11 06/03/2017 09:35 AM    Creatinine 0.75 06/03/2017 09:35 AM    BUN/Creatinine ratio 15 06/03/2017 09:35 AM    GFR est AA >60 06/03/2017 09:35 AM    GFR est non-AA >60 06/03/2017 09:35 AM    Calcium 7.6 06/03/2017 09:35 AM    Bilirubin, total 1.3 06/03/2017 09:35 AM    ALT (SGPT) 60 06/03/2017 09:35 AM    AST (SGOT) 102 06/03/2017 09:35 AM    Alk. phosphatase 340 06/03/2017 09:35 AM    Protein, total 6.5 06/03/2017 09:35 AM    Albumin 1.8 06/03/2017 09:35 AM    Globulin 4.7 06/03/2017 09:35 AM    A-G Ratio 0.4 06/03/2017 09:35 AM            Urine Culture   Component      Latest Ref Rng & Units 5/27/2017           4:51 PM   Special Requests:       NO SPECIAL REQUESTS . . .   Rector Count       >100,000 . . .   Culture result:       >2 ORGANISMS - CONTAMINATED SPECIMEN. SUGGEST RECOLLECTION       Blood Culture -   Component      Latest Ref Rng & Units 5/30/2017 5/30/2017           1:29 PM  4:04 AM   Special Requests:       NO SPECIAL REQUESTS NO SPECIAL REQUESTS   Culture result:       NO GROWTH 6 DAYS NO GROWTH 6 DAYS     Component      Latest Ref Rng & Units 5/27/2017           6:12 PM   Special Requests:          Culture result:       PRELIMINARY REPORT OF (A) . . .     Component      Latest Ref Rng & Units 5/27/2017           6:12 PM   Special Requests:       NO SPECIAL REQUESTS   Culture result:       ESCHERICHIA COLI (A) . . .           NN has updated care team members in the patient's chart.           RRAT score:   Low Risk            12       Total Score        3 Relationship with PCP    4 More than 1 Admission in calendar year    5 Charlson Comorbidity Score        Criteria that do not apply:    Patient Living Status    Patient Length of Stay > 5    Patient Insurance is Medicare, Medicaid or Self Pay                  Advance Medical Directive on file in EMR? Yes; has an advanced directive - a copy has been provided. Patient was evaluated by care management during hospitalization. Per notes, Discharge Disposition from [facility]: [facility] (SNF); was residing in [address] [place] [address] prior to admission. Recommended Post-Hospital Discharge follow-up (see below as listed on Hospital Discharge AVS/Instructions). Follow-up Information     Follow up With Details Comments Contact Info     [facility])   This is your post acute care provider of choice  [address] [facility]  [phone]     Leavy Carrel III,  In 1 week   [id number] [facility]  [address] [facility]   Celina Luna MD In 1 week   [address] [facility]  [address]  [facility]  [phone]                 - Patient currently admitted to [facility] (SNF). NN follow-up  NN contacted [facility] today in order to confirm patient's admission. NN was informed that the patient is currently admitted to [facility]. NN requested to be transferred to Discharge Planning; NN was transferred. NN spoke with Mai Hicks and was informed that expected length of stay/ discharge disposition is currently undetermined. NN requested notification of projected discharge date/disposition, when available; NN contact information provided. New medications at discharge include: Aspirin, Keflex, Shakira-Q, Percocet  Medication(s) changed at hospital discharge include: n/a  Medication(s) discontinued at hospital discharge include: ibuprofen, meloxicam, tramadol            NN will route this encounter to Dr. Lila Wood for notification/review. This note will not be viewable in [address]

## 2017-06-06 ENCOUNTER — EXTERNAL NURSING HOME DOCUMENTATION (OUTPATIENT)
Dept: INTERNAL MEDICINE CLINIC | Age: 82
End: 2017-06-06

## 2017-06-06 DIAGNOSIS — I10 ESSENTIAL HYPERTENSION: ICD-10-CM

## 2017-06-06 DIAGNOSIS — G30.1 LATE ONSET ALZHEIMER'S DISEASE WITHOUT BEHAVIORAL DISTURBANCE (HCC): ICD-10-CM

## 2017-06-06 DIAGNOSIS — F02.80 LATE ONSET ALZHEIMER'S DISEASE WITHOUT BEHAVIORAL DISTURBANCE (HCC): ICD-10-CM

## 2017-06-06 DIAGNOSIS — N17.9 ACUTE RENAL INJURY (HCC): ICD-10-CM

## 2017-06-06 DIAGNOSIS — S32.000A CLOSED COMPRESSION FRACTURE OF LUMBOSACRAL SPINE, INITIAL ENCOUNTER (HCC): Primary | ICD-10-CM

## 2017-06-06 DIAGNOSIS — R26.9 GAIT ABNORMALITY: ICD-10-CM

## 2017-06-08 ENCOUNTER — PATIENT OUTREACH (OUTPATIENT)
Dept: INTERNAL MEDICINE CLINIC | Age: 82
End: 2017-06-08

## 2017-06-08 NOTE — PROGRESS NOTES
Community Care Team Documentation for Patient in [facility]  Initial Follow Up       Patient was admitted to [facility] from 5/27/17 to 6/3/17. Patient was discharged to [facility], on 6/3/17 (date). See previous [facility] Care Team documentation under Patient Outreach. Hospital Discharge diagnosis:  Sepsis, gram negative, UTI, Acute kidney injury. RRAT score: Low Risk            12       Total Score        3 Relationship with PCP    4 More than 1 Admission in calendar year    5 Charlson Comorbidity Score        Criteria that do not apply:    Patient Living Status    Patient Length of Stay > 5    Patient Insurance is Medicare, Medicaid or Self Pay          Advance Medical Directive on file in EMR? yes has an advanced directive - a copy has been provided    Total Hospitalizations/ED visits last 6 months? IP - 1; ED - 0    PCP : Tiffany Nissen, DO  Nurse Navigator in PCP office: Colin Wolfe  Note routed to Nurse Navigator team.    PCP follow up appointment:  Not yet scheduled    Information provided today by SNF staff member, Raphael Dozier, is as follows:    SNF Attending:  Dr. Ghazal Saldana    Pt lives in independent living at [facility]. On PRN pain meds for lower back pain. Working on BP control. PT/OT continuing. Care plan meeting Monday 6/12.     Anticipated discharge date from SNF:  Undetermined    SNF discharge plan:  Undetermined    Elisha Shin, MSN, RN, ACNS-BC, [facility]  Nurse Navigator, Busy Moos [phone]

## 2017-06-12 LAB
CREATININE, EXTERNAL: 0.77
INR, EXTERNAL: 1.1
PT, EXTERNAL: 11.2

## 2017-06-13 ENCOUNTER — EXTERNAL NURSING HOME DOCUMENTATION (OUTPATIENT)
Dept: INTERNAL MEDICINE CLINIC | Age: 82
End: 2017-06-13

## 2017-06-13 DIAGNOSIS — Z98.890 S/P KYPHOPLASTY: ICD-10-CM

## 2017-06-13 DIAGNOSIS — M54.5 LOW BACK PAIN, UNSPECIFIED BACK PAIN LATERALITY, UNSPECIFIED CHRONICITY, WITH SCIATICA PRESENCE UNSPECIFIED: Primary | ICD-10-CM

## 2017-06-13 DIAGNOSIS — S32.000A CLOSED COMPRESSION FRACTURE OF LUMBOSACRAL SPINE, INITIAL ENCOUNTER (HCC): ICD-10-CM

## 2017-06-13 DIAGNOSIS — F02.80 LATE ONSET ALZHEIMER'S DISEASE WITHOUT BEHAVIORAL DISTURBANCE (HCC): ICD-10-CM

## 2017-06-13 DIAGNOSIS — G30.1 LATE ONSET ALZHEIMER'S DISEASE WITHOUT BEHAVIORAL DISTURBANCE (HCC): ICD-10-CM

## 2017-06-13 NOTE — PROGRESS NOTES
THIS IS NOT A COMPLETE MEDICAL RECORD ON THIS PATIENT. THIS IS A PATIENT AT [facility]. PLEASE CONTACT THE FACILITY LISTED BELOW FOR MORE INFORMATION ON THIS PATIENT. THE COMPLETE RECORD RESIDES WITH THIS LONG TERM CARE FACILITY. HISTORY OF PRESENT ILLNESS  Thomas Walker is a [de-identified] y.o. female. This patient is currently under the care of Dr. Presley Claude at [facility].  The patient's past medical history includes HTN, dementia, arthritis, and frequent UTIs. The patient was admitted to this facility after hospitalization at [facility] after a fall on 5/23/17. The patient had complaints of left lower back pain and was also noted to be increasingly confused. She underwent kyphoplasty for L5 compression fracture. She was also treated for UTI. The patient was also noted during hospitalization to have liver cirrhosis. She was seen by Dr. Brianne Truong yesterday for follow-up of liver cirrhosis and was recommended to consider a Tylenol free pain medication. She was also started on lactulose and recommended to have labs and paracentesis. The patient is seen per family request.  There is some concern that the patient has not had good pain control. She experiences lower back pain with activity and this has affected ability to participate in PT/OT. She is currently ordered Percocet 5-325 mg tablet 1-2 tabs po q3h PRN pain. Family also expresses interest in adding vitamin D supplementation. Patient is currently receiving Calcium 500 mg daily. Calmoseptine is also requested for diaper dermatitis. HPI    Review of Systems   Constitutional: Negative for chills and fever. HENT: Negative for congestion. Respiratory: Negative for cough and shortness of breath. Cardiovascular: Negative for chest pain and leg swelling. Gastrointestinal: Negative for abdominal pain. Genitourinary: Negative. Musculoskeletal: Positive for back pain. Skin: Negative. Neurological: Negative. Negative for headaches. Physical Exam   Constitutional: She appears well-developed and well-nourished. No distress. HENT:   Head: Normocephalic and atraumatic. Cardiovascular: Normal rate and regular rhythm. Pulmonary/Chest: Effort normal and breath sounds normal. No respiratory distress. She has no wheezes. She has no rales. Abdominal: Soft. Bowel sounds are normal.   Musculoskeletal: She exhibits no edema. Neurological: She is alert. Oriented to self and place   Skin: Skin is warm and dry. Psychiatric: She has a normal mood and affect. Nursing note and vitals reviewed. ASSESSMENT and PLAN  Encounter Diagnoses   Name Primary?  Low back pain, unspecified back pain laterality, unspecified chronicity, with sciatica presence unspecified Yes    Late onset Alzheimer's disease without behavioral disturbance     Closed compression fracture of lumbosacral spine, initial encounter     S/P kyphoplasty      D/C Percocet. Will order Oxycontin 10 mg po bid. Will order oxycodone 5 mg po tid PRN pain. Will order Vitamin D3 400 units po daily. Will order Calmoseptine to affected area tid and PRN. Reviewed medications and side effects in detail    Nursing to continue to monitor closely and notify MD/NP of any change in condition. Discussed above plan of care with Dr. Jeanmarie Patel.

## 2017-06-14 ENCOUNTER — PATIENT OUTREACH (OUTPATIENT)
Dept: INTERNAL MEDICINE CLINIC | Age: 82
End: 2017-06-14

## 2017-06-14 NOTE — PROGRESS NOTES
Pre call completed with patient's daughter regarding upcoming procedure. Pt. Last dose of Baby Aspirin was 6/13/2017.

## 2017-06-14 NOTE — PROGRESS NOTES
NNTOCIP Post Hospitalization           NN follow-up phone call  NN contacted [facility] ([facility]) today in order to follow-up on the patient. NN was informed that the patient is currently admitted to SNF. NN requested to be transferred to SNF SW/CM/Discharge Planning to inquire about discharge planning, including estimated length of stay, projected discharge date, projected discharge disposition. NN lvm for Discharge Planning/SW requesting a return phone call to this NN; NN direct contact information provided. NN will route this encounter to Kishor Vasquez DO for notification. This note will not be viewable in [address]

## 2017-06-15 ENCOUNTER — HOSPITAL ENCOUNTER (OUTPATIENT)
Dept: ULTRASOUND IMAGING | Age: 82
Discharge: HOME | End: 2017-06-15
Attending: SPECIALIST
Payer: MEDICARE

## 2017-06-15 ENCOUNTER — PATIENT OUTREACH (OUTPATIENT)
Dept: CASE MANAGEMENT | Age: 82
End: 2017-06-15

## 2017-06-15 VITALS — OXYGEN SATURATION: 98 % | RESPIRATION RATE: 20 BRPM | HEART RATE: 67 BPM | TEMPERATURE: 98.6 F

## 2017-06-15 DIAGNOSIS — K74.60 CIRRHOSIS (HCC): ICD-10-CM

## 2017-06-15 DIAGNOSIS — R15.9 ENCOPRESIS WITHOUT CONSTIPATION AND OVERFLOW INCONTINENCE: ICD-10-CM

## 2017-06-15 DIAGNOSIS — R18.8 OTHER ASCITES: ICD-10-CM

## 2017-06-15 PROCEDURE — 76705 ECHO EXAM OF ABDOMEN: CPT

## 2017-06-15 NOTE — PROGRESS NOTES
Community Care Team Documentation for Patient in [facility]  Subsequent Follow up     Patient remains at [facility] ([facility]). See previous [facility] Team notes. RRAT score: Low Risk            12       Total Score        3 Relationship with PCP    4 More than 1 Admission in calendar year    5 Charlson Comorbidity Score        Criteria that do not apply:    Patient Living Status    Patient Length of Stay > 5    Patient Insurance is Medicare, Medicaid or Self Pay         PCP : Wiliam Hazel DO    Information provided today by SNF staff member, William Bond, is as follows:    Care plan meeting held Monday 6/12. Plan for pt to transition from [facility] to [address] [place] [address]. Pt continues to report lower back pain. Pain meds changed. Pt continues to work with PT/OT.       RIVER StrangeW

## 2017-06-16 ENCOUNTER — EXTERNAL NURSING HOME DOCUMENTATION (OUTPATIENT)
Dept: INTERNAL MEDICINE CLINIC | Age: 82
End: 2017-06-16

## 2017-06-16 DIAGNOSIS — F02.80 LATE ONSET ALZHEIMER'S DISEASE WITHOUT BEHAVIORAL DISTURBANCE (HCC): ICD-10-CM

## 2017-06-16 DIAGNOSIS — S32.000A CLOSED COMPRESSION FRACTURE OF LUMBOSACRAL SPINE, INITIAL ENCOUNTER (HCC): Primary | ICD-10-CM

## 2017-06-16 DIAGNOSIS — I10 ESSENTIAL HYPERTENSION: ICD-10-CM

## 2017-06-16 DIAGNOSIS — R26.9 GAIT ABNORMALITY: ICD-10-CM

## 2017-06-16 DIAGNOSIS — G30.1 LATE ONSET ALZHEIMER'S DISEASE WITHOUT BEHAVIORAL DISTURBANCE (HCC): ICD-10-CM

## 2017-06-16 NOTE — PROGRESS NOTES
Subjective:  Carmen Robb has been admitted with compression fractures with lower back pain and urinary tract infection. She also was found to have cirrhosis of the liver. She has been staying in rehab for physical therapy and occupational therapy. Pain is better, not quite controlled. She was having some constipation because of pain medications, which has been taken care of. She is feeling much better now. She has seen GI doctors at followup for possible cirrhosis. She was found to have probable primary biliary cirrhosis from the blood work. She stills need to do biopsy of the liver. She is following up with GI for that. No other discomfort. No chest pain, no palpitations. She is eating okay. Objective:    VITALS:  T:  98.2 degrees Fahrenheit. P:  64 per minute. BP:  112/69 mmHg. SaO2:  95% on room air. Weight is 143.4 pounds. HEENT:  No abnormality detected. NECK:  Supple, no JVD, no carotid bruits, no thyromegaly. CHEST:  Chest is clear to auscultation bilaterally. No rales, no rhonchi. CARDIOVASCULAR:  S1, S2 normal.  Regular rate and rhythm. ABDOMEN:  Soft, nontender, nondistended, bowel sounds present. EXTREMITIES:  No edema is noted. BACK:  Tenderness in the lower back. Assessment and Plan:   1. Compression fractures, status post kyphoplasty. The patient will continue with physical therapy and occupational therapy. She is on pain medications, which has been changed to Oxycodone since Percocet has Tylenol, which we have removed. 2. Primary biliary cirrhosis, recent diagnosis by GI. The patient's pain medications have been changed to make it Tylenol free. She will follow up with the GI doctors for further investigations and starting treatment. 3. Hypertension. Blood pressure is stable. The patient is on Norvasc, doing okay. 4. Dementia. The patient is on Namenda and Aricept. She is stable. 5. Gait abnormality.   She will continue physical therapy and occupational therapy here. THIS IS NOT A COMPLETE MEDICAL RECORD ON THIS PATIENT.    THIS IS A PATIENT AT [facility].    PLEASE CONTACT THE FACILITY LISTED BELOW FOR MORE INFORMATION ON THIS PATIENT.    THE COMPLETE RECORD RESIDES WITH THIS LONG TERM CARE Marlene Patrick MD

## 2017-06-22 ENCOUNTER — PATIENT OUTREACH (OUTPATIENT)
Dept: INTERNAL MEDICINE CLINIC | Age: 82
End: 2017-06-22

## 2017-06-22 NOTE — PROGRESS NOTES
Community Care Team Documentation for Patient in [facility]  Subsequent Follow up     Patient remains at [facility] ([facility]). See previous [facility] Team notes. RRAT score: Low Risk            12       Total Score        3 Relationship with PCP    4 More than 1 Admission in calendar year    5 Charlson Comorbidity Score        Criteria that do not apply:    Patient Living Status    Patient Length of Stay > 5    Patient Insurance is Medicare, Medicaid or Self Pay         PCP : Senia Wynn DO  Nurse Navigator in PCP office: Reynold Khan    PCP follow up appointment:  Not yet scheduled    Information provided today by SNF staff member, Chung Jorge, is as follows:    PT/OT/SLP continue. Patient progresssing. Pain control is barrier. Patient has scheduled oxycontin and PRN oxycodone. Patient having liver biopsy tomorrow, 6/23. Plan for return to [facility] at [facility]. Date TBD.     Anticipated discharge date from SNF:  Undetermined  SNF discharge plan:  Plan for return to [address] Leblanc, MSN, RN, ACNS-BC, [facility]  Nurse Navigator, [place] [phone]

## 2017-06-26 ENCOUNTER — HOSPITAL ENCOUNTER (OUTPATIENT)
Dept: ULTRASOUND IMAGING | Age: 82
Discharge: HOME | End: 2017-06-26
Attending: INTERNAL MEDICINE
Payer: MEDICARE

## 2017-06-26 VITALS
BODY MASS INDEX: 27 KG/M2 | HEART RATE: 66 BPM | DIASTOLIC BLOOD PRESSURE: 50 MMHG | SYSTOLIC BLOOD PRESSURE: 99 MMHG | HEIGHT: 61 IN | RESPIRATION RATE: 20 BRPM | WEIGHT: 143 LBS | OXYGEN SATURATION: 94 % | TEMPERATURE: 98.6 F

## 2017-06-26 DIAGNOSIS — K92.1 BLOOD IN STOOL: ICD-10-CM

## 2017-06-26 DIAGNOSIS — K64.9 UNSPECIFIED HEMORRHOIDS WITHOUT MENTION OF COMPLICATION: ICD-10-CM

## 2017-06-26 DIAGNOSIS — K74.60 CIRRHOSIS (HCC): ICD-10-CM

## 2017-06-26 DIAGNOSIS — R18.8 PERITONEAL EFFUSION (CHRONIC): ICD-10-CM

## 2017-06-26 DIAGNOSIS — R15.9 ENCOPRESIS WITHOUT CONSTIPATION AND OVERFLOW INCONTINENCE: ICD-10-CM

## 2017-06-26 PROCEDURE — 88307 TISSUE EXAM BY PATHOLOGIST: CPT | Performed by: RADIOLOGY

## 2017-06-26 PROCEDURE — 47000 NEEDLE BIOPSY OF LIVER PERQ: CPT

## 2017-06-26 PROCEDURE — 77030003503 HC NDL BIOP TISS BD -B

## 2017-06-26 PROCEDURE — 88313 SPECIAL STAINS GROUP 2: CPT | Performed by: RADIOLOGY

## 2017-06-26 PROCEDURE — 74011250636 HC RX REV CODE- 250/636: Performed by: RADIOLOGY

## 2017-06-26 RX ORDER — MIDAZOLAM HYDROCHLORIDE 1 MG/ML
5 INJECTION, SOLUTION INTRAMUSCULAR; INTRAVENOUS
Status: DISCONTINUED | OUTPATIENT
Start: 2017-06-26 | End: 2017-06-30 | Stop reason: HOSPADM

## 2017-06-26 RX ORDER — SODIUM CHLORIDE 9 MG/ML
25 INJECTION, SOLUTION INTRAVENOUS CONTINUOUS
Status: DISCONTINUED | OUTPATIENT
Start: 2017-06-26 | End: 2017-06-30 | Stop reason: HOSPADM

## 2017-06-26 RX ORDER — FENTANYL CITRATE 50 UG/ML
100 INJECTION, SOLUTION INTRAMUSCULAR; INTRAVENOUS
Status: DISCONTINUED | OUTPATIENT
Start: 2017-06-26 | End: 2017-06-30 | Stop reason: HOSPADM

## 2017-06-26 RX ADMIN — SODIUM CHLORIDE 25 ML/HR: 900 INJECTION, SOLUTION INTRAVENOUS at 10:01

## 2017-06-26 RX ADMIN — FENTANYL CITRATE 25 MCG: 50 INJECTION, SOLUTION INTRAMUSCULAR; INTRAVENOUS at 10:30

## 2017-06-26 RX ADMIN — FENTANYL CITRATE 25 MCG: 50 INJECTION, SOLUTION INTRAMUSCULAR; INTRAVENOUS at 10:16

## 2017-06-26 RX ADMIN — MIDAZOLAM HYDROCHLORIDE 1 MG: 1 INJECTION INTRAMUSCULAR; INTRAVENOUS at 10:15

## 2017-06-26 NOTE — DISCHARGE INSTRUCTIONS
[facility]  Special Procedures/Radiology Department    Radiologist:  Dr. Mark Anthony Galeas    Date:6/26/2017    Liver Biopsy Discharge Instructions    You may have an aching pain in the biopsy site tonight. Take Tylenol, as directed on the label, for pain or discomfort. Avoid ibuprofen (Advil, Motrin) and aspirin for the next 48 hours as these drugs may cause you to bleed. Resume your previous diet and follow the medication reconciliation form. Rest today. Do not drive or sign any legal documents activity for 24 hours because you received sedation medications. Avoid any strenuous activity for 24 hours. Do not lift anything heavier than a small grocery bag (10 pounds) and avoid twisting for the next 5 days. If you experience severe sweating, severe abdominal pain, dizziness or faintness, go to the nearest Emergency Room immediately. Pain under the left collar bone is normal.    Watch for signs of infection at biopsy site:  redness, pain, drainage, fever chills. If this occurs, call you doctor. Contact your physician after 5 business days for test results. If you have any questions or concerns, please call [phone] and ask to speak to the nurse on-call.

## 2017-06-26 NOTE — IP AVS SNAPSHOT
[address] [facility] 
[phone] Patient: Ruben Medrano 
MRN: [id number] WYT:[id number]/[id number] You are allergic to the following Allergen Reactions Ciprofloxacin Itching Ditropan (Oxybutynin Chloride) Itching Lisinopril Itching Sulfa (Sulfonamide Antibiotics) Itching Toprol Xl (Metoprolol Succinate) Itching Recent Documentation OB Status Smoking Status Postmenopausal Never Smoker Emergency Contacts Name Discharge Info Relation Home Work Mobile [address] [employer] CAREGIVER [3] Daughter [21] [phone] Abby Wood  Child [2] [phone] Monster Landis [2] [phone] About your hospitalization You were admitted on:  June 26, 2017 You last received care in the:  [facility] Ultrasound You were discharged on:  June 26, 2017 Unit phone number:  [phone] Why you were hospitalized Your primary diagnosis was:  Not on File Providers Seen During Your Hospitalizations Provider Role Specialty Primary office phone Mino Islas MD Attending Provider Gastroenterology [phone] Your Primary Care Physician (PCP) Primary Care Physician Office Phone Office Fax Екатерина STOCK [phone] Follow-up Information None Your Appointments Monday June 26, 2017  9:00 AM EDT  
US GUIDE BX WAQAS PERC with [address] [facility] Ultrasound [address]) [address] [facility]  
[phone] DIET RESTICTIONS Please be NPO (nothing by mouth) for 4-6 hours prior to procedure. GENERAL INSTRUCTIONS 1.  Bring any non [facility] facility films/reports pertaining to the area being studied with you on the day of appointment. 2. Bring a list of all medications you are currently taking, including over the counter medications. 3. A written order with a valid diagnosis and Physicians signature is required for all scheduled tests. 4. Blood thinners and platelet inhibitors should be stopped 3-5 days prior to procedure. Consult your ordering physician prior to stopping them. 5. Check in at registration 1 hour before your appointment time unless you were instructed to do otherwise. 6. A  must be present prior to the start of the procedure. 7. The procedure may last 1-1 ½ hours. You may be required to stay 4-6 hours after the procedure for observation. --Lab work for bleeding times (INR, PT , PTT and platelets) should be be completed at least the day before the exam.  Without this information the patient is delayed or  rescheduled. Patient should report to outpatient registration ([facility]) 30 minutes prior to the appointment time unless instructed otherwise. Friday July 21, 2017 11:30 AM EDT ROUTINE CARE with Schuyler Arriaga III, DO [address]  
[phone] Current Discharge Medication List  
  
ASK your doctor about these medications Dose & Instructions Dispensing Information Comments Morning Noon Evening Bedtime  
 amLODIPine 5 mg tablet Commonly known as:  Marek Perazaa Your last dose was: Your next dose is: TAKE 1 TABLET BY MOUTH DAILY FOR BLOOD PRESSURE. Quantity:  30 Tab Refills:  9  
     
   
   
   
  
 aspirin delayed-release 81 mg tablet Your last dose was: Your next dose is:    
   
   
 Dose:  81 mg Take 1 Tab by mouth daily. Quantity:  30 Tab Refills:  1 CALCIUM 500 PO Your last dose was: Your next dose is: Take  by mouth daily. Refills:  0  
     
   
   
   
  
 donepezil 10 mg tablet Commonly known as:  ARICEPT Your last dose was: Your next dose is: TAKE 1 TABLET BY MOUTH EVERY NIGHT. Quantity:  30 Tab Refills:  6  
     
   
   
   
  
 eluxadoline 75 mg tablet Commonly known as:  VIBERZI Your last dose was: Your next dose is:    
   
   
 Dose:  75 mg Take 1 Tab by mouth two (2) times daily (with meals). Max Daily Amount: 150 mg.  
 Quantity:  60 Tab Refills:  6  
     
   
   
   
  
 escitalopram oxalate 10 mg tablet Commonly known as:  Pricila Benitez Your last dose was: Your next dose is: TAKE 1 TABLET BY MOUTH DAILY. Quantity:  30 Tab Refills:  9  
     
   
   
   
  
 fluorometholone 0.1 % ophthalmic suspension Commonly known as:  FML Your last dose was: Your next dose is:    
   
   
 Dose:  1 Drop Administer 1 Drop to both eyes two (2) times a day. Refills:  0  
     
   
   
   
  
 memantine 5 mg tablet Commonly known as:  Shyann Grade Your last dose was: Your next dose is: TAKE 1 TABLET BY MOUTH DAILY. Quantity:  30 Tab Refills:  9  
     
   
   
   
  
 omega-3 fatty acids-vitamin e 1,000 mg Cap Your last dose was: Your next dose is:    
   
   
 Dose:  1 Cap Take 1 Cap by mouth daily. Refills:  0  
     
   
   
   
  
 oxyCODONE-acetaminophen 5-325 mg per tablet Commonly known as:  PERCOCET Your last dose was: Your next dose is:    
   
   
 Dose:  1-2 Tab Take 1-2 Tabs by mouth every three (3) hours as needed. Max Daily Amount: 16 Tabs. Quantity:  20 Tab Refills:  0 Ovid Zortman Commonly known as:  ULTRA-LIGHT ROLLATOR Your last dose was: Your next dose is: Dx: F03.90 M19.90 M25.551 Quantity:  1 Each Refills:  0 Discharge Instructions Crescencio Kennedy [facility] Special Procedures/Radiology Department Radiologist:  Dr. Srinivasa Rios Date:6/26/2017 Liver Biopsy Discharge Instructions You may have an aching pain in the biopsy site tonight. Take Tylenol, as directed on the label, for pain or discomfort. Avoid ibuprofen (Advil, Motrin) and aspirin for the next 48 hours as these drugs may cause you to bleed. Resume your previous diet and follow the medication reconciliation form. Rest today. Do not drive or sign any legal documents activity for 24 hours because you received sedation medications. Avoid any strenuous activity for 24 hours. Do not lift anything heavier than a small grocery bag (10 pounds) and avoid twisting for the next 5 days. If you experience severe sweating, severe abdominal pain, dizziness or faintness, go to the nearest Emergency Room immediately. Pain under the left collar bone is normal. 
 
Watch for signs of infection at biopsy site:  redness, pain, drainage, fever chills. If this occurs, call you doctor. Contact your physician after 5 business days for test results. If you have any questions or concerns, please call [phone] and ask to speak to the nurse on-call. Discharge Orders None ACO Transitions of Care Introducing [employer] [address] offers a voluntary care coordination program to provide high quality service and care to [facility] fee-for-service beneficiaries. [facility] was designed to help you enhance your health and well-being through the following services: ? Transitions of Care  support for individuals who are transitioning from one care setting to another (example: Hospital to home). ?  Chronic and Complex Care Coordination  support for individuals and caregivers of those with serious or chronic illnesses or with more than one chronic (ongoing) condition and those who take a number of different medications. If you meet specific medical criteria, a [address] [place] [address] Rd may call you directly to coordinate your care with your primary care physician and your other care providers. For questions about the [facility] programs, please, contact your physicians office. For general questions or additional information about Accountable Care Organizations: 
Please visit www.medicare.gov/acos. html or call 1-800-MEDICARE (3-962.579.3746) TTY users should call 7-913.327.5742. Introducing [facility]! Dear Toño Zhao: Thank you for requesting a [employer] account. Our records indicate that you already have an active [employer] account. You can access your account anytime at [email or web address] Did you know that you can access your hospital and ER discharge instructions at any time in [employer]? You can also review all of your test results from your hospital stay or ER visit. Additional Information If you have questions, please visit the Frequently Asked Questions section of the [employer] website at [email or web address] Remember, [employer] is NOT to be used for urgent needs. For medical emergencies, dial 911. Now available from your iPhone and Android! General Information Please provide this summary of care documentation to your next provider. Patient Signature:  ____________________________________________________________ Date:  ____________________________________________________________  
  
Dayana Star Provider Signature:  ____________________________________________________________ Date:  ____________________________________________________________

## 2017-06-26 NOTE — H&P
Radiology History and Physical    Patient: Dov Allen [de-identified] y.o. female       Chief Complaint: No chief complaint on file. History of Present Illness: cirrhosis evaluation    History:    Past Medical History:   Diagnosis Date    Arthritis     hands    Hypertension     Incontinence of urine     Other ill-defined conditions     freq UTIs    Thromboembolus ([address] 5-2011    left leg    Unspecified adverse effect of anesthesia     PONV     Family History   Problem Relation Age of Onset    Cancer Father      lung, prostate    Heart Disease Brother     No Known Problems Mother      Social History     Social History    Marital status:      Spouse name: N/A    Number of children: N/A    Years of education: N/A     Occupational History    Not on file. Social History Main Topics    Smoking status: Never Smoker    Smokeless tobacco: Never Used    Alcohol use No    Drug use: No    Sexual activity: No     Other Topics Concern    Not on file     Social History Narrative       Allergies: Allergies   Allergen Reactions    Ciprofloxacin Itching    Ditropan [Oxybutynin Chloride] Itching    Lisinopril Itching    Sulfa (Sulfonamide Antibiotics) Itching    Toprol Xl [Metoprolol Succinate] Itching       Current Medications:  Current Outpatient Prescriptions   Medication Sig    aspirin delayed-release 81 mg tablet Take 1 Tab by mouth daily.  oxyCODONE-acetaminophen (PERCOCET) 5-325 mg per tablet Take 1-2 Tabs by mouth every three (3) hours as needed. Max Daily Amount: 16 Tabs.  memantine (NAMENDA) 5 mg tablet TAKE 1 TABLET BY MOUTH DAILY.  escitalopram oxalate (LEXAPRO) 10 mg tablet TAKE 1 TABLET BY MOUTH DAILY.  amLODIPine (NORVASC) 5 mg tablet TAKE 1 TABLET BY MOUTH DAILY FOR BLOOD PRESSURE.  eluxadoline (VIBERZI) 75 mg tablet Take 1 Tab by mouth two (2) times daily (with meals). Max Daily Amount: 150 mg.    donepezil (ARICEPT) 10 mg tablet TAKE 1 TABLET BY MOUTH EVERY NIGHT.  fluorometholone (FML) 0.1 % ophthalmic suspension Administer 1 Drop to both eyes two (2) times a day.  Walker (ULTRA-LIGHT ROLLATOR) misc Dx: F03.90 M19.90 M25.551    omega-3 fatty acids-vitamin e 1,000 mg cap Take 1 Cap by mouth daily.  CALCIUM CARBONATE (CALCIUM 500 PO) Take  by mouth daily. Current Facility-Administered Medications   Medication Dose Route Frequency    fentaNYL citrate (PF) injection 100 mcg  100 mcg IntraVENous Multiple    midazolam (VERSED) injection 5 mg  5 mg IntraVENous Multiple    0.9% sodium chloride infusion  25 mL/hr IntraVENous CONTINUOUS        Physical Exam:  Blood pressure 99/50, pulse 66, temperature 98.6 °F (37 °C), resp. rate 20, height 5' 1\" (1.549 m), weight 64.9 kg (143 lb), SpO2 94 %. LUNG: clear to auscultation bilaterally, HEART: regular rate and rhythm, S1, S2 normal, no murmur, click, rub or gallop      Alerts:    Hospital Problems  Date Reviewed: 4/14/2017    None          Laboratory:    No results for input(s): HGB, HCT, WBC, PLT, INR, BUN, CREA, K, CRCLT, HGBEXT, HCTEXT, PLTEXT in the last 72 hours. No lab exists for component: PTT, PT, INREXT      Plan of Care/Planned Procedure:  Risks, benefits, and alternatives reviewed with patient and she agrees to proceed with the procedure.      Plan is for US guided liver biopsy      Srinivasa Rios MD

## 2017-06-26 NOTE — PROGRESS NOTES
Discharge instructions reviewed with patient. Patient verbalizes understanding. Discharged from IR via wheelchair in stable condition. Pt released with family member via wheelchair.

## 2017-06-26 NOTE — IP AVS SNAPSHOT
Current Discharge Medication List  
  
ASK your doctor about these medications Dose & Instructions Dispensing Information Comments Morning Noon Evening Bedtime  
 amLODIPine 5 mg tablet Commonly known as:  Karyle Rohrer Your last dose was: Your next dose is: TAKE 1 TABLET BY MOUTH DAILY FOR BLOOD PRESSURE. Quantity:  30 Tab Refills:  9  
     
   
   
   
  
 aspirin delayed-release 81 mg tablet Your last dose was: Your next dose is:    
   
   
 Dose:  81 mg Take 1 Tab by mouth daily. Quantity:  30 Tab Refills:  1 CALCIUM 500 PO Your last dose was: Your next dose is: Take  by mouth daily. Refills:  0  
     
   
   
   
  
 donepezil 10 mg tablet Commonly known as:  ARICEPT Your last dose was: Your next dose is: TAKE 1 TABLET BY MOUTH EVERY NIGHT. Quantity:  30 Tab Refills:  6  
     
   
   
   
  
 eluxadoline 75 mg tablet Commonly known as:  VIBERZI Your last dose was: Your next dose is:    
   
   
 Dose:  75 mg Take 1 Tab by mouth two (2) times daily (with meals). Max Daily Amount: 150 mg.  
 Quantity:  60 Tab Refills:  6  
     
   
   
   
  
 escitalopram oxalate 10 mg tablet Commonly known as:  Kofi Marc Your last dose was: Your next dose is: TAKE 1 TABLET BY MOUTH DAILY. Quantity:  30 Tab Refills:  9  
     
   
   
   
  
 fluorometholone 0.1 % ophthalmic suspension Commonly known as:  FML Your last dose was: Your next dose is:    
   
   
 Dose:  1 Drop Administer 1 Drop to both eyes two (2) times a day. Refills:  0  
     
   
   
   
  
 memantine 5 mg tablet Commonly known as:  Mary You Your last dose was: Your next dose is: TAKE 1 TABLET BY MOUTH DAILY. Quantity:  30 Tab Refills:  9 omega-3 fatty acids-vitamin e 1,000 mg Cap Your last dose was: Your next dose is:    
   
   
 Dose:  1 Cap Take 1 Cap by mouth daily. Refills:  0  
     
   
   
   
  
 oxyCODONE-acetaminophen 5-325 mg per tablet Commonly known as:  PERCOCET Your last dose was: Your next dose is:    
   
   
 Dose:  1-2 Tab Take 1-2 Tabs by mouth every three (3) hours as needed. Max Daily Amount: 16 Tabs. Quantity:  20 Tab Refills:  0 Ana Bench Commonly known as:  ULTRA-LIGHT ROLLATOR Your last dose was: Your next dose is: Dx: F03.90 M19.90 M25.551 Quantity:  1 Each Refills:  0

## 2017-06-29 ENCOUNTER — PATIENT OUTREACH (OUTPATIENT)
Dept: INTERNAL MEDICINE CLINIC | Age: 82
End: 2017-06-29

## 2017-06-29 NOTE — PROGRESS NOTES
Community Care Team Documentation for Patient in [facility]  Subsequent Follow up     Patient remains at [facility] ([facility]). See previous [facility] Team notes. RRAT score: 20    PCP : Peggy Luong DO  Nurse Navigator in PCP office: Lucho Coppola    PCP follow up appointment:  Not yet scheduled    Per Wes Almonte at [facility], PT/OT/ST continues, pain management, liver biopsy completed, awaiting results. Patient participating in PT/OT for gait abnormality. Plan for return to [facility] nursing home at [facility].      Anticipated discharge date from SNF:  Undetermined    SNF discharge plan:  Undetermined    Elmer Arciniega, MSN, RN, ACNS-BC, [facility]  Nurse Navigator, [employer] [phone]

## 2017-07-06 ENCOUNTER — EXTERNAL NURSING HOME DOCUMENTATION (OUTPATIENT)
Dept: INTERNAL MEDICINE CLINIC | Age: 82
End: 2017-07-06

## 2017-07-06 ENCOUNTER — PATIENT OUTREACH (OUTPATIENT)
Dept: CASE MANAGEMENT | Age: 82
End: 2017-07-06

## 2017-07-06 DIAGNOSIS — R41.0 CONFUSION: Primary | ICD-10-CM

## 2017-07-06 DIAGNOSIS — F03.90 DEMENTIA WITHOUT BEHAVIORAL DISTURBANCE, UNSPECIFIED DEMENTIA TYPE: ICD-10-CM

## 2017-07-06 NOTE — PROGRESS NOTES
THIS IS NOT A COMPLETE MEDICAL RECORD ON THIS PATIENT. THIS IS A PATIENT AT [facility]. PLEASE CONTACT THE FACILITY LISTED BELOW FOR MORE INFORMATION ON THIS PATIENT. THE COMPLETE RECORD RESIDES WITH THIS LONG TERM CARE FACILITY. HISTORY OF PRESENT ILLNESS  Primitivo Reyes is a [de-identified] y.o. female. This patient is currently under the care of Dr. Jessie Butcher at [facility].  The patient's past medical history includes HTN, dementia, arthritis, and frequent UTIs. The patient was admitted to this facility after hospitalization at [facility] after a fall on 5/23/17. The patient had complaints of left lower back pain and was also noted to be increasingly confused. She underwent kyphoplasty for L5 compression fracture. She was also treated for UTI. The patient was also noted during hospitalization to have liver cirrhosis. The patient is seen today per family request.  The patient's family states they have noted a change in cognition over the last 10 days, including increased confusion and drowsiness. Per nursing, the patient's interactions with nursing have appeared at baseline. The patient is found this morning lying in bed. She denies chest pain and shortness of breath. She does describe some aching pain to her lower back. No GI/ problems. The patient's vitals have been stable, per nursing. HPI    Review of Systems   Constitutional: Negative for chills and fever. HENT: Negative for congestion. Respiratory: Negative for cough and shortness of breath. Cardiovascular: Negative for chest pain and leg swelling. Gastrointestinal: Negative for abdominal pain. Genitourinary: Negative. Musculoskeletal: Positive for back pain. Skin: Negative. Neurological: Negative. Negative for headaches. Physical Exam   Constitutional: She appears well-developed and well-nourished. No distress. HENT:   Head: Normocephalic and atraumatic. Cardiovascular: Normal rate and regular rhythm. Pulmonary/Chest: Effort normal and breath sounds normal. No respiratory distress. She has no wheezes. She has no rales. Abdominal: Soft. Bowel sounds are normal.   Musculoskeletal: She exhibits no edema. Neurological: She is alert. Oriented to self and place  Answering questions appropriately   Skin: Skin is warm and dry. Psychiatric: She has a normal mood and affect. Nursing note and vitals reviewed. ASSESSMENT and PLAN  Encounter Diagnoses   Name Primary?  Confusion Yes    Dementia without behavioral disturbance, unspecified dementia type    Will order CBC, CMP, ammonia, and U/A C&S. Reviewed medications and side effects    Nursing to continue to monitor closely and notify MD/NP of any change in condition. Discussed above plan of care with Dr. Hoang Rajan.

## 2017-07-12 ENCOUNTER — EXTERNAL NURSING HOME DOCUMENTATION (OUTPATIENT)
Dept: INTERNAL MEDICINE CLINIC | Age: 82
End: 2017-07-12

## 2017-07-12 DIAGNOSIS — M79.642 LEFT HAND PAIN: Primary | ICD-10-CM

## 2017-07-12 DIAGNOSIS — L53.9 ERYTHEMA OF HAND: ICD-10-CM

## 2017-07-12 DIAGNOSIS — M79.89 SWELLING OF LEFT HAND: ICD-10-CM

## 2017-07-12 NOTE — PROGRESS NOTES
THIS IS NOT A COMPLETE MEDICAL RECORD ON THIS PATIENT. THIS IS A PATIENT AT [facility]. PLEASE CONTACT THE FACILITY LISTED BELOW FOR MORE INFORMATION ON THIS PATIENT. THE COMPLETE RECORD RESIDES WITH THIS LONG TERM CARE FACILITY. HISTORY OF PRESENT ILLNESS  Deacon Rodríguez is a [de-identified] y.o. female. This patient is currently under the care of Dr. Wade Espinosa at [facility].  The patient's past medical history includes HTN, dementia, arthritis, and frequent UTIs. The patient was admitted to this facility after hospitalization at [id number] [place] [address] after a fall on 5/23/17. The patient had complaints of left lower back pain and was also noted to be increasingly confused. She underwent kyphoplasty for L5 compression fracture. She was also treated for UTI. The patient was also noted during hospitalization to have liver cirrhosis. The patient is seen today per nursing request.  The patient was noted by nursing this morning to have erythema and mild edema to left hand. Patient denies injury at this time. The patient's vitals have been stable, per nursing. HPI    Review of Systems   Constitutional: Negative for chills and fever. HENT: Negative for congestion. Respiratory: Negative for cough and shortness of breath. Cardiovascular: Negative for chest pain and leg swelling. Gastrointestinal: Negative for abdominal pain. Genitourinary: Negative. Musculoskeletal: Positive for joint pain and myalgias. Skin:        Redness to left hand   Neurological: Negative. Negative for headaches. Physical Exam   Constitutional: She appears well-developed and well-nourished. No distress. HENT:   Head: Normocephalic and atraumatic. Cardiovascular: Normal rate and regular rhythm. Pulmonary/Chest: Effort normal and breath sounds normal. No respiratory distress. She has no wheezes. She has no rales. Abdominal: Soft. Bowel sounds are normal.   Musculoskeletal: She exhibits edema and tenderness.    ROM of left wrist and left fingers is intact   Neurological: She is alert. Oriented to self and place  Answering questions appropriately   Skin: Skin is warm and dry. There is erythema. Area of erythema noted to dorsal surface of left hand and wrist   No induration noted  No fluctuance noted  No open area noted  Affected area is warm to touch and tender to palpation   Psychiatric: She has a normal mood and affect. Nursing note and vitals reviewed. ASSESSMENT and PLAN  Encounter Diagnoses   Name Primary?  Left hand pain Yes    Swelling of left hand     Erythema of hand      X-ray of left hand indicates arthritic change with fracture at the base of the index finger distal phalanx. No pain, edema, or erythema noted to left index finger. Continue Ceftin 500 mg po bid x 7 days (previously ordered for UTI). Will order warm compresses to affected area tid x 5 days. Encouraged elevation of hand as able. Will order Meloxicam 7.5 mg po daily x 7 days. Nursing to continue to monitor affected area closely and notify of worsening/ failure to improve. Will order finger splint to left index finger. Patient may follow-up with hand specialist if interested. Reviewed medications and side effects in detail    Nursing to continue to monitor closely and notify MD/NP of any change in condition. Discussed above plan of care with Dr. Nahum Meade and Dr. Derrick Herman.

## 2017-07-13 ENCOUNTER — EXTERNAL NURSING HOME DOCUMENTATION (OUTPATIENT)
Dept: INTERNAL MEDICINE CLINIC | Age: 82
End: 2017-07-13

## 2017-07-13 ENCOUNTER — PATIENT OUTREACH (OUTPATIENT)
Dept: INTERNAL MEDICINE CLINIC | Age: 82
End: 2017-07-13

## 2017-07-13 DIAGNOSIS — R60.0 EDEMA OF HAND: ICD-10-CM

## 2017-07-13 DIAGNOSIS — M79.642 PAIN IN LEFT HAND: ICD-10-CM

## 2017-07-13 DIAGNOSIS — S62.609A FINGER FRACTURE, LEFT, CLOSED, INITIAL ENCOUNTER: Primary | ICD-10-CM

## 2017-07-13 DIAGNOSIS — L53.9 ERYTHEMA OF HAND: ICD-10-CM

## 2017-07-13 NOTE — PROGRESS NOTES
THIS IS NOT A COMPLETE MEDICAL RECORD ON THIS PATIENT. THIS IS A PATIENT AT [facility]. PLEASE CONTACT THE FACILITY LISTED BELOW FOR MORE INFORMATION ON THIS PATIENT. THE COMPLETE RECORD RESIDES WITH THIS LONG TERM CARE FACILITY. HISTORY OF PRESENT ILLNESS  Richardson Dudley is a [de-identified] y.o. female. This patient is currently under the care of Dr. Shelley Atkinson at [facility].  The patient's past medical history includes HTN, dementia, arthritis, and frequent UTIs. The patient was admitted to this facility after hospitalization at [facility] after a fall on 5/23/17. The patient had complaints of left lower back pain and was also noted to be increasingly confused. She underwent kyphoplasty for L5 compression fracture. She was also treated for UTI. The patient was also noted during hospitalization to have liver cirrhosis. The patient was noted by nursing yesterday to have erythema and mild edema to left hand. Patient is currently taking Ceftin 500 mg bid. She was also started on Mobic 7.5 mg daily. X-ray of left hand 7/12/17 indicates acute fracture involving the base of the distal phalanx of the middle finer as well as degenerative spurs involving DIP joints of index and middle fingers. The patient denies pain of the fingers of the left hand. The patient's vitals have been stable, per nursing. HPI    Review of Systems   Constitutional: Negative for chills and fever. HENT: Negative for congestion. Respiratory: Negative for cough and shortness of breath. Cardiovascular: Negative for chest pain and leg swelling. Gastrointestinal: Negative for abdominal pain. Genitourinary: Negative. Musculoskeletal: Positive for joint pain and myalgias. Skin:        Redness to left hand   Neurological: Negative. Negative for headaches. Physical Exam   Constitutional: She appears well-developed and well-nourished. No distress. HENT:   Head: Normocephalic and atraumatic.    Cardiovascular: Normal rate and regular rhythm. Pulmonary/Chest: Effort normal and breath sounds normal. No respiratory distress. She has no wheezes. She has no rales. Abdominal: Soft. Bowel sounds are normal.   Musculoskeletal: She exhibits edema and tenderness. ROM of left wrist and left fingers is intact   Neurological: She is alert. Oriented to self and place  Answering questions appropriately   Skin: Skin is warm and dry. There is erythema. Area of erythema noted to dorsal surface of left hand and wrist- reduced from yesterday  No induration noted  No fluctuance noted  No open area noted  Affected area is warm to touch and tender to palpation   Psychiatric: She has a normal mood and affect. Nursing note and vitals reviewed. ASSESSMENT and PLAN  Encounter Diagnoses   Name Primary?  Finger fracture, left, closed, initial encounter Yes    Erythema of hand     Pain in left hand     Edema of hand      Continue Ceftin 500 mg po bid x 7 days (previously ordered for UTI). Will order warm compresses to affected area tid x 5 days. Encouraged elevation of hand as able. Continue Meloxicam 7.5 mg po daily x 7 days. Will order Jose Alfredo-wrapping of left 1st and 2nd fingers. Repeat x-ray in 4 weeks. Reviewed medications and side effects in detail    Nursing to continue to monitor closely and notify MD/NP of any change in condition. Discussed above plan of care with Dr. Chase Bernard.

## 2017-07-14 ENCOUNTER — EXTERNAL NURSING HOME DOCUMENTATION (OUTPATIENT)
Dept: INTERNAL MEDICINE CLINIC | Age: 82
End: 2017-07-14

## 2017-07-14 DIAGNOSIS — S32.000A CLOSED COMPRESSION FRACTURE OF LUMBOSACRAL SPINE, INITIAL ENCOUNTER (HCC): ICD-10-CM

## 2017-07-14 DIAGNOSIS — M43.6: ICD-10-CM

## 2017-07-14 DIAGNOSIS — F03.90 DEMENTIA WITHOUT BEHAVIORAL DISTURBANCE, UNSPECIFIED DEMENTIA TYPE: ICD-10-CM

## 2017-07-14 DIAGNOSIS — S62.663A CLOSED NONDISPLACED FRACTURE OF DISTAL PHALANX OF LEFT MIDDLE FINGER, INITIAL ENCOUNTER: Primary | ICD-10-CM

## 2017-07-14 DIAGNOSIS — I10 ESSENTIAL HYPERTENSION: ICD-10-CM

## 2017-07-14 NOTE — PROGRESS NOTES
Subjective:  Muna Pruett was admitted with compression fractures and leukocytosis from UTI. She has been having another episode of UTI and she was treated with Ceftin. Now, she is feeling better. She has been suffering from neck pain and neck stiffness since she has been at [facility], probably positional, but right now she is saying her neck seems a bit stiff too. No weakness in the arms or legs. She had redness on her hand. She did not complain about any fall or injury, but we have x-rayed her left hand and found that she has acute fracture in the base of left middle distal phalanx. She denies any pain. Her finger was Jose Alfredo taped and she was referred to the doctor. Her x-ray showed she has generalized osteopenia. As per the daughter, the patient had osteoporosis ten years back, but she never had follow-up bone density and she is not on any Fosamax. She was very active before. As per the daughter, the patient's pain is not quite controlled. She is on long acting pain medication, OxyContin and also on Oxycodone. She had cirrhosis of the liver found. Biopsy was done and showed some chronic inflammation. The patient will be seen by a hepatologist for that. Otherwise, she is not in any distress. She has lost 12 pounds since she is here, even though she is eating well as per daughter. She has a good appetite. Objective:    VITALS:  T:  97.4 degrees Fahrenheit. P:  64 per minute. BP:  137/64 mmHg. SaO2:  97% on room air. Weight is 131 pounds. HEENT:  No abnormality detected. NECK:  Supple, no JVD, no carotid bruits, no thyromegaly. The patient's neck seems stiff, muscle spasm present. Cervical spine is nontender. CHEST:  Chest is clear to auscultation bilaterally. No rales, no rhonchi. CARDIOVASCULAR:  S1, S2 normal.  Regular rate and rhythm. ABDOMEN:  Soft, nontender, nondistended, bowel sounds present. EXTREMITIES:  Left hand - no redness right now.   Left middle finger was [employer] taped. No swelling. Assessment and Plan:   1. Acute fracture of the left middle distal phalanx. The patient probably has osteoporosis. She is not complaining about any pain. The finger is Jose Alfredo taped. She is referred to Dr. Lynette Peralta for further management. 2. Neck stiffness/wry neck. I will put her on Baclofen 10 mg twice a day for five days. We will monitor. It is probably positional.  PT is already using Biofreeze for her neck. 3. Weight loss, unintentional.  The patient is eating well. I will check CBC, CMP and TSH for that. 4. Recurrent urinary tract infection. The patient was treated with Ceftin twice a day. We will repeat UA next week. 5. Dementia. She is on Aricept and Namenda, doing fine. 6. Cirrhosis of the liver. The patient will see Dr. Annemarie Garza soon. THIS IS NOT A COMPLETE MEDICAL RECORD ON THIS PATIENT.    THIS IS A PATIENT AT [facility].    PLEASE CONTACT THE FACILITY LISTED BELOW FOR MORE INFORMATION ON THIS PATIENT.    THE COMPLETE RECORD RESIDES WITH THIS LONG TERM CARE Guanako Patrick MD

## 2017-07-20 ENCOUNTER — HOSPITAL ENCOUNTER (OUTPATIENT)
Dept: GENERAL RADIOLOGY | Age: 82
Discharge: HOME | End: 2017-07-20
Payer: MEDICARE

## 2017-07-20 ENCOUNTER — PATIENT OUTREACH (OUTPATIENT)
Dept: INTERNAL MEDICINE CLINIC | Age: 82
End: 2017-07-20

## 2017-07-20 DIAGNOSIS — R76.11 POSITIVE TB TEST: ICD-10-CM

## 2017-07-20 PROCEDURE — 71020 XR CHEST PA LAT: CPT

## 2017-07-20 NOTE — PROGRESS NOTES
Community Care Team Documentation for Patient in [facility]  Subsequent Follow up     Patient remains at [facility] ([facility]). See previous [facility] Team notes. RRAT score: 20    PCP : Rukhsana Dodson DO  Nurse Navigator in PCP office: Gal Rodriguez    PCP follow up appointment:  Not yet scheduled    Per Emerson Gant at [facility], Neck pain being managed. Patient feeling better after treatment for UTI. OT/PT/SLP continues. Plan to DC to [facility]. Date undetermined    Anticipated discharge date from SNF:  Undetermined    SNF discharge plan:  Return to [facility].     Taina Hogan, MSN, RN, ACNS-BC, [facility]  Nurse Navigator, [employer] [phone]

## 2017-07-24 ENCOUNTER — EXTERNAL NURSING HOME DOCUMENTATION (OUTPATIENT)
Dept: INTERNAL MEDICINE CLINIC | Age: 82
End: 2017-07-24

## 2017-07-24 DIAGNOSIS — K21.9 GASTROESOPHAGEAL REFLUX DISEASE WITHOUT ESOPHAGITIS: Primary | ICD-10-CM

## 2017-07-24 DIAGNOSIS — M79.642 LEFT HAND PAIN: ICD-10-CM

## 2017-07-24 NOTE — PROGRESS NOTES
THIS IS NOT A COMPLETE MEDICAL RECORD ON THIS PATIENT. THIS IS A PATIENT AT [facility]. PLEASE CONTACT THE FACILITY LISTED BELOW FOR MORE INFORMATION ON THIS PATIENT. THE COMPLETE RECORD RESIDES WITH THIS LONG TERM CARE FACILITY. HISTORY OF PRESENT ILLNESS  Damien Underwood is a [de-identified] y.o. female. This patient is currently under the care of Dr. Regina Olmos at [facility].  The patient's past medical history includes HTN, dementia, arthritis, and frequent UTIs. The patient was admitted to this facility after hospitalization at [facility] after a fall on 5/23/17. The patient had complaints of left lower back pain and was also noted to be increasingly confused. She underwent kyphoplasty for L5 compression fracture. She was also treated for UTI. The patient was also noted during hospitalization to have liver cirrhosis. The patient is seen today per family request.  She has had complaints of discomfort due to indigestion. The patient was previously ordered a two week trial of omeprazole, which apparently did help with symptoms, and is now complete. The patient was treated about ten days ago related to redness of her left hand. She has completed course of Ceftin and Meloxicam.  Her daughter notes that she has mild redness to the hand this morning. The patient's vitals have been stable, per nursing. HPI    Review of Systems   Constitutional: Negative for chills and fever. HENT: Negative for congestion. Respiratory: Negative for cough and shortness of breath. Cardiovascular: Negative for chest pain and leg swelling. Gastrointestinal: Positive for heartburn. Negative for abdominal pain, constipation, diarrhea, nausea and vomiting. Genitourinary: Negative. Musculoskeletal: Positive for joint pain and myalgias. Skin:        Redness to left hand   Neurological: Negative. Negative for headaches. Physical Exam   Constitutional: She appears well-developed and well-nourished.  No distress. HENT:   Head: Normocephalic and atraumatic. Cardiovascular: Normal rate and regular rhythm. Pulmonary/Chest: Effort normal and breath sounds normal. No respiratory distress. She has no wheezes. She has no rales. Abdominal: Soft. Bowel sounds are normal.   Musculoskeletal: She exhibits tenderness. She exhibits no edema. ROM of left wrist and left fingers is intact  Mild tenderness left hand   Neurological: She is alert. Oriented to self and place  Answering questions appropriately   Skin: Skin is warm and dry. There is erythema. Faint erythema noted to dorsal surface of left hand   No induration noted  No fluctuance noted  No open area noted   Psychiatric: She has a normal mood and affect. Nursing note and vitals reviewed. ASSESSMENT and PLAN  Encounter Diagnoses   Name Primary?  Gastroesophageal reflux disease without esophagitis Yes    Left hand pain      Will order omeprazole 20 mg po daily. Follow-up as scheduled with gastroenterology, Dr. Vijaya Norman. Will order warm compress to left hand tid x 5 days. Nursing to monitor and notify of worsening erythema or other s/sx of infection. Reviewed medications and side effects in detail    Nursing to continue to monitor closely and notify MD/NP of any change in condition. Discussed above plan of care with patient's daughter who is at bedside.

## 2017-07-27 ENCOUNTER — PATIENT OUTREACH (OUTPATIENT)
Dept: INTERNAL MEDICINE CLINIC | Age: 82
End: 2017-07-27

## 2017-07-27 NOTE — PROGRESS NOTES
Community Care Team Documentation for Patient in [facility]  Subsequent Follow up     Patient remains at [facility] ([facility]). See previous [facility] Team notes. RRAT score: 20    PCP : Evon Miles DO  Nurse Navigator in PCP office: Lidia Holland    PCP follow up appointment:  Not yet scheduled    Per Colby Pate at [facility], PT/OT/SLP continues. Patient making progress. Plan to DC to [facility]. About 2 more weeks. Date undetermined. Anticipated discharge date from SNF:  Undetermined    SNF discharge plan:  Undetermined    Medications were not reconciled and general patient assessment was not completed during this skilled nursing facility outreach.      Sharion Callas, MSN, RN, ACNS-BC, [facility]  Nurse Navigator, [employer] [phone]

## 2017-07-28 ENCOUNTER — EXTERNAL NURSING HOME DOCUMENTATION (OUTPATIENT)
Dept: INTERNAL MEDICINE CLINIC | Age: 82
End: 2017-07-28

## 2017-07-28 DIAGNOSIS — M79.642 LEFT HAND PAIN: ICD-10-CM

## 2017-07-28 DIAGNOSIS — F03.90 DEMENTIA WITHOUT BEHAVIORAL DISTURBANCE, UNSPECIFIED DEMENTIA TYPE: ICD-10-CM

## 2017-07-28 DIAGNOSIS — R41.0 CONFUSION: Primary | ICD-10-CM

## 2017-07-28 NOTE — PROGRESS NOTES
THIS IS NOT A COMPLETE MEDICAL RECORD ON THIS PATIENT. THIS IS A PATIENT AT [facility]. PLEASE CONTACT THE FACILITY LISTED BELOW FOR MORE INFORMATION ON THIS PATIENT. THE COMPLETE RECORD RESIDES WITH THIS LONG TERM CARE FACILITY. HISTORY OF PRESENT ILLNESS  You Lehman is a [de-identified] y.o. female. This patient is currently under the care of Dr. Bimal Lopez at [facility].  The patient's past medical history includes HTN, dementia, arthritis, and frequent UTIs. The patient was admitted to this facility after hospitalization at [facility] after a fall on 5/23/17. The patient had complaints of left lower back pain and was also noted to be increasingly confused. She underwent kyphoplasty for L5 compression fracture. She was also treated for UTI. The patient was also noted during hospitalization to have liver cirrhosis. The patient is seen today per family request.  She has been noted by family and speech therapy to have increased confusion over the last couple of days. The patient has history of recurrent UTI and family expresses concern for this. The patient was treated about two weeks ago related to redness of her left hand. She has completed course of Ceftin and Meloxicam.  Her daughter notes that patient continues to have tenderness to affected area, with mild redness. Gloves provided by therapy are helping. The patient's vitals have been stable, per nursing. HPI    Review of Systems   Constitutional: Negative for chills and fever. HENT: Negative for congestion. Respiratory: Negative for cough and shortness of breath. Cardiovascular: Negative for chest pain and leg swelling. Gastrointestinal: Negative for abdominal pain. Genitourinary: Negative. Musculoskeletal: Positive for joint pain and myalgias. Skin:        Redness to left hand   Neurological: Negative. Negative for headaches. Physical Exam   Constitutional: She appears well-developed and well-nourished.  No distress. HENT:   Head: Normocephalic and atraumatic. Cardiovascular: Normal rate and regular rhythm. Pulmonary/Chest: Effort normal and breath sounds normal. No respiratory distress. She has no wheezes. She has no rales. Abdominal: Soft. Bowel sounds are normal.   Musculoskeletal: She exhibits tenderness. She exhibits no edema. ROM of left wrist and left fingers is intact  Mild tenderness left hand   Neurological: She is alert. Oriented to self and place  Answering questions appropriately   Skin: Skin is warm and dry. There is erythema. Faint erythema noted to dorsal surface of left hand   No induration noted  No fluctuance noted  No open area noted   Psychiatric: She has a normal mood and affect. Nursing note and vitals reviewed. ASSESSMENT and PLAN  Encounter Diagnoses   Name Primary?  Confusion Yes    Dementia without behavioral disturbance, unspecified dementia type     Left hand pain    Will order U/A C&S. Will order CBC, CMP, ESR, uric acid level next lab day. Lab results and schedule of future lab studies reviewed  Reviewed medications and side effects in detail    Nursing to continue to monitor closely and notify MD/NP of any change in condition.

## 2017-08-03 ENCOUNTER — PATIENT OUTREACH (OUTPATIENT)
Dept: INTERNAL MEDICINE CLINIC | Age: 82
End: 2017-08-03

## 2017-08-03 NOTE — PROGRESS NOTES
Community Care Team Documentation for Patient in [facility]  Subsequent Follow up     Patient remains at [facility] ([facility]). See previous [facility] Team notes. RRAT score: 20    PCP : Jose Lewis DO  Nurse Navigator in PCP office: Amairani Perez    PCP follow up appointment:  None scheduled. Patient moving to Memory Care Unit. Per Akhil Gomez at [facility], Patient on antibiotic for UTI starting 8/1 for 1 week. Patient well controlled at this time. Patient's last day with therapy will be 8/8. Plan to DC to [facility] on 8/9. Anticipated discharge date from SNF:  8/9/17    SNF discharge plan:  DC to [facility]    Medications were not reconciled and general patient assessment was not completed during this skilled nursing facility outreach.      Daniel Ley, MSN, RN, ACNS-BC, [facility]  Nurse Navigator, [employer] [phone]

## 2017-08-07 ENCOUNTER — EXTERNAL NURSING HOME DOCUMENTATION (OUTPATIENT)
Dept: INTERNAL MEDICINE CLINIC | Age: 82
End: 2017-08-07

## 2017-08-07 DIAGNOSIS — K74.60 CIRRHOSIS OF LIVER WITHOUT ASCITES, UNSPECIFIED HEPATIC CIRRHOSIS TYPE (HCC): ICD-10-CM

## 2017-08-07 DIAGNOSIS — F32.A DEPRESSION, UNSPECIFIED DEPRESSION TYPE: ICD-10-CM

## 2017-08-07 DIAGNOSIS — S32.000S CLOSED COMPRESSION FRACTURE OF LUMBOSACRAL SPINE, SEQUELA: ICD-10-CM

## 2017-08-07 DIAGNOSIS — I10 ESSENTIAL HYPERTENSION: ICD-10-CM

## 2017-08-07 DIAGNOSIS — N39.0 RECURRENT UTI: ICD-10-CM

## 2017-08-07 DIAGNOSIS — F03.90 DEMENTIA WITHOUT BEHAVIORAL DISTURBANCE, UNSPECIFIED DEMENTIA TYPE: Primary | ICD-10-CM

## 2017-08-07 NOTE — PROGRESS NOTES
THIS IS NOT A COMPLETE MEDICAL RECORD ON THIS PATIENT. THIS IS A PATIENT AT [facility]. PLEASE CONTACT THE FACILITY LISTED BELOW FOR MORE INFORMATION ON THIS PATIENT. THE COMPLETE RECORD RESIDES WITH THIS LONG TERM CARE FACILITY. HISTORY OF PRESENT ILLNESS  Damien Underwood is a [de-identified] y.o. female. This patient is currently under the care of Dr. Regina Olmos at [facility].  The patient's past medical history includes HTN, dementia, arthritis, and frequent UTIs. The patient was admitted to this facility after hospitalization at [facility] after a fall on 5/23/17. The patient had complaints of left lower back pain and was also noted to be increasingly confused. She underwent kyphoplasty for L5 compression fracture. She was also treated for UTI. The patient was also noted during hospitalization to have liver cirrhosis. Patient is currently scheduled to discharge to [facility] on 08/09/17. The patient is found today sitting up in her room. She states she is feeling well. She denies pain at this time. No chest pain, shortness of breath, or GI/ complaints. The patient's vitals remain stable, per nursing. Patient is currently being treated for UTI with Augmentin. HPI    Review of Systems   Constitutional: Negative for chills and fever. HENT: Negative for congestion. Respiratory: Negative for cough and shortness of breath. Cardiovascular: Negative for chest pain and leg swelling. Gastrointestinal: Negative for abdominal pain. Genitourinary: Negative. Musculoskeletal: Negative. Skin: Negative. Neurological: Negative. Negative for headaches. Physical Exam   Constitutional: She appears well-developed and well-nourished. No distress. HENT:   Head: Normocephalic and atraumatic. Cardiovascular: Normal rate and regular rhythm. Pulmonary/Chest: Effort normal and breath sounds normal. No respiratory distress. She has no wheezes. She has no rales. Abdominal: Soft. Bowel sounds are normal.   Musculoskeletal: She exhibits no edema. Compression gloves to hands bilaterally   Neurological: She is alert. Oriented to self and place  Answering questions appropriately   Skin: Skin is warm and dry. Psychiatric: She has a normal mood and affect. Nursing note and vitals reviewed. ASSESSMENT and PLAN  Encounter Diagnoses   Name Primary?  Dementia without behavioral disturbance, unspecified dementia type Yes    Cirrhosis of liver without ascites, unspecified hepatic cirrhosis type ([facility] [address]     Essential hypertension     Recurrent UTI     Depression, unspecified depression type     Closed compression fracture of lumbosacral spine, sequela      Patient to have home health services upon discharge. Will provide prescriptions for 30 day supply of medications at discharge. [facility] and [facility]. Patient to follow-up with PCP within 2 weeks of discharge and specialists as scheduled. Completed Assisted Living H&P, per request.    Nursing to continue to monitor closely and notify MD/NP of any change in condition prior to discharge. [place]

## 2017-08-09 ENCOUNTER — TELEPHONE (OUTPATIENT)
Dept: INTERNAL MEDICINE CLINIC | Age: 82
End: 2017-08-09

## 2017-08-10 ENCOUNTER — PATIENT OUTREACH (OUTPATIENT)
Dept: INTERNAL MEDICINE CLINIC | Age: 82
End: 2017-08-10

## 2017-08-10 NOTE — PROGRESS NOTES
Community Care Team Documentation for Patient in [facility]  Discharge Note    Per SNF staff, patient discharged from [facility] ([facility]). See previous [facility] Team notes. PCP : Daniela Matos III,     PCP KULWINDER follow up appointment:  None - patient moved to memory care unit     Nurse Navigator in PCP office: Amairani Corporal  Note routed to Nurse Navigator team.    Per Akhil Gomez at [facility], [facility] .    Medications were not reconciled and general patient assessment was not completed during this skilled nursing facility outreach.      Daniel Ley, MSN, RN, ACNS-BC, [facility]  Nurse Navigator, [employer] [phone]

## 2018-03-05 NOTE — PROGRESS NOTES
History of Present Illness:   Kendrick Mantilla is an [de-identified]year-old  female who was brought to [facility] after a fall on 05/23/2017. She started complaining of left lower back pain. No one witnessed her fall. Her daughter has been giving the patient Tramadol and Meloxicam for pain, but she seems more confused and that is why she was brought to the emergency room. In the emergency room, she was found to have urinary tract infection and also found to have leukocytosis. She underwent an MRI of the lower back that showed compression fractures. She was seen by a spine surgeon and underwent kyphoplasty for compression fracture. For her leukocytosis, she was treated with IV Rocephin followed by p.o. Keflex right now. One of her blood cultures came back positive for E. coli. Her confusion got better. She also developed acute renal failure, probably from dehydration. Medications have been changed. She has received IV fluids. She was stabilized and was transferred to [facility]. She is doing well here, but still has moderate low back pain. During her hospitalization, the patient was found to have liver cirrhosis. Daughter is trying to find a liver specialist for her. Past Medical History:   1. Arthritis. 2. Hypertension. 3. Incontinence of urine. 4. Frequent urinary tract infections. 5. Left leg DVT. 6. Unspecified adverse effect of anesthesia. 7. Dementia. Past Surgical History:    1. Right breast biopsy. 2. History of cataract removal.   3. Cholecystectomy. 4. Bladder sling. 5. Left leg vein procedure for DVT. Social History:  The patient has never been a smoker. Never drinks alcohol. She was living at home. Family History:  Father has prostate and lung cancer. Brother had heart disease. Mother has no medical problems. Allergies:  She is allergic to Ciprofloxacin, Ditropan, Lisinopril, sulfa and Toprol XL.      Medications:  Aspirin 81 mg once daily, Keflex 500 mg twice a day for three more days, probiotic one tablet every day, Oxycodone 5/325 one to two tablets every three hours as needed for pain, Amlodipine 5 mg one daily, calcium 500 mg once daily, Aricept 10 mg every night, Viberzi 75 mg one tablet two times daily, Lexapro 10 mg one tablet every day, Fluromethalone 0.1% ophthalmic suspension one drop twice a day as needed, Namenda 5 mg one tablet every day, fish oil 1000 mg one capsule every day. Review of Systems:  A complete review of systems was done. Right now, significant for lower back pain and rash. Physical Examination:    GENERAL:  This is a pleasant white female not appearing in any distress. VITALS:  T:  98.6 degrees Fahrenheit. P:  67 per minute. BP:  150/59 mmHg. SaO2:  96% on room air. Weight is 154 pounds. HEENT:  No abnormality detected. NECK:  Supple, no JVD, no carotid bruits, no thyromegaly. CHEST:  Chest is clear to auscultation bilaterally. No rales, no rhonchi. CARDIOVASCULAR:  S1, S2 normal.  Regular rate and rhythm. ABDOMEN:  Soft, nontender, nondistended, bowel sounds present. EXTREMITIES:  No edema is noted. Dorsalis pedis pulse normal with equal flow. NEUROLOGICAL:  Alert and oriented x2. Mild dementia is present. Cranial nerves II - XII grossly intact. Motor is 5/5 bilaterally. Sensory is within normal limits. SKIN:  Mild friction rash noted on the side of the thigh, probably from diaper. Assessment and Plan:   1. Lower back pain after fall causing compression fractures, status post kyphoplasty. The patient is on Percocet as needed, which is helping her pain. She continues with physical therapy and occupational therapy. 2. Leukocytosis with possible UTI. The patient has received IV Rocephin in the hospital.  Right now, the patient is on Keflex. She is afebrile. One of her blood cultures came back positive, but the patient has no fever right now. 3. Hypertension. Blood pressure is controlled right now. She was on Hydrochlorothiazide, but it caused her dehydration, so she is off Hydrochlorothiazide. She is only taking Norvasc. Blood pressure is under control. 4. Dementia. The patient is on Aricept and Namenda, doing well. 5. Acute renal failure. Received IV fluids. Hydrochlorothiazide was discontinued. She is doing much better. 6. Liver cirrhosis. Incidental finding from CT of abdomen. She will see a hepatologist.   7. Gait abnormality. The patient will receive physical therapy and occupational therapy here. THIS IS NOT A COMPLETE MEDICAL RECORD ON THIS PATIENT.    THIS IS A PATIENT AT [facility].    PLEASE CONTACT THE FACILITY LISTED BELOW FOR MORE INFORMATION ON THIS PATIENT.    THE COMPLETE RECORD RESIDES WITH THIS LONG TERM CARE Malika Patrick MD No

## 2019-04-01 NOTE — PROGRESS NOTES
Progress Note          Pt Name  Екатерина Quezada   Date of Birth 1935   Medical Record Number  [id number]      Age  [de-identified] y.o. PCP Rajesh Bishop III, DO   Admit date:  5/27/2017    Room Number  0/80  @ [facility]   Date of Service  5/28/2017     Admission Diagnoses:  UTI (urinary tract infection)     Assessment and plan:     Present on Admission:   Sepsis    UTI (urinary tract infection)   Altered mental status   Elevated troponin - NSTEMI    Fall at home -    Closed compression fracture of lumbosacral spine (HCC)   Acute renal injury ([facility] [address]     Depression   Mixed incontinence urge and stress   HTN (hypertension)   Dementia      Body mass index is 23.76 kg/(m^2). PLAN  · Given her age, dementia and functional capacity - we will treat her conservatively for SNTEMI   · Await cultures to result - meantime we will continue Rocephin as is. She did have recent UTI with Klebsiella that was resistant to Cephalosporins. But but given her WBC trending down and improvement in mentation - we will continue that drug - unless C&S dictates   · We will ask IR to look at her MRI if opine if kyphoplasty is indicated   · Supportive care for her dementia  · AMS might have been contributed by UTI - now improving   · PT OT to see her next working day         CODE STATUS   DNR   Functional Status  Pt resides in an independent living facility   Her daughter keeps close eye on her     Surrogate decision maker:  Pt's daughter      Prophylaxis   Lovenox   Discharge Plan:   PT, OT, RN,      Misc   Benefit:  [place] / Plan: [place] MED[place]ARE PART A & B / Product Type: Medicare /    Isolation :  There are currently no Active Isolations   ADT status:  INPATIENT      Query   None noted today    Prognosis   Fair    Social issues  Date  Comment     05/28/17 I met with the pt's daughter in her room. We had extensive discussion about clinical issues.    I explained above plans in simple language and hand wrote most of the above diagnoses in notepad for their reference                   Subjective Data     \"OK \"  Review of Systems - History obtained from unobtainable from patient due to mental status    Objective Data       Comments  Elderly lady who has dementia and she is unable to answer any questions meaningfully lying in bed in no distress      Patient Vitals for the past 24 hrs:   BP   05/28/17 0921 131/66   05/28/17 0321 141/69   05/28/17 0006 114/56   05/27/17 1859 147/72   05/27/17 1530 107/41   05/27/17 1410 106/60    Patient Vitals for the past 24 hrs:   Pulse   05/28/17 0921 (!) 55   05/28/17 0321 89   05/28/17 0006 63   05/27/17 1859 65   05/27/17 1410 65    Patient Vitals for the past 24 hrs:   Resp   05/28/17 0921 14   05/28/17 0321 16   05/28/17 0006 16   05/27/17 1859 18   05/27/17 1410 16    Patient Vitals for the past 24 hrs:   Temp   05/28/17 0921 98.1 °F (36.7 °C)   05/28/17 0321 98.4 °F (36.9 °C)   05/28/17 0006 98.1 °F (36.7 °C)   05/27/17 1859 98.3 °F (36.8 °C)   05/27/17 1410 98.1 °F (36.7 °C)        SpO2 Readings from Last 6 Encounters:   05/28/17 93%   04/14/17 94%   01/27/17 94%   01/13/17 94%   10/13/16 94%   09/25/16 92%          O2 Device: Room air Body mass index is 23.76 kg/(m^2). -  Wt Readings from Last 10 Encounters:   05/28/17 65.8 kg (145 lb)   04/14/17 66.7 kg (147 lb)   01/27/17 65.3 kg (144 lb)   01/13/17 66.7 kg (147 lb)   10/13/16 71.2 kg (157 lb)   09/25/16 76.2 kg (168 lb)   07/13/16 76.5 kg (168 lb 9.6 oz)   03/24/16 78.9 kg (174 lb)   01/27/16 82.9 kg (182 lb 12.8 oz)   01/20/16 81.6 kg (180 lb)        Physical Exam:             General:  Alert, cooperative,   Follows command   well noursished,   well developed,   appears stated age    Ears/Eyes:  Hearing intact  Sclera anicteric.    Pupils equal   Mouth/Throat:  Mucous membranes normal pink and moist  Oral pharynx clear    Neck:     Lungs:  Trachea midline  Chest excursion symmetrical   Auscultation B/L Symmetrical with   Vesicular breath sounds     No Crepitations noted   Percussion note resonant on mid Clavicular line; no sign of pneumothorax        CVS:  Regular rate and rhythm   no  murmur,   No click, rub or gallop  S1 normal   S2 normal   Pedal pulses  b/l symmetrical   Abdomen:    Soft, non-tender  Bowel sounds normal  No distension   Percussion note tympanitic   Extremities:    No cyanosis, jaundice  No edema noted   No sign of DVT/cord like lesion on palpation  No sign of acute trauma   Age appropriate OA changes noted. Skin:    Skin color, texture, turgor normal. no acute rash or lesions    Lymph nodes:     Musculoskeletal Muscle bulk B/L symmetrical   Neuro Cranial nerves are intact,   motor movement b/l symmetrical,   Sensory evaluation b/l symmetrical    Psych:  Alert and oriented to self only             Medications reviewed     Current Facility-Administered Medications   Medication Dose Route Frequency    enoxaparin (LOVENOX) injection 30 mg  30 mg SubCUTAneous Q24H    0.9% sodium chloride infusion  75 mL/hr IntraVENous CONTINUOUS    memantine (NAMENDA) tablet 5 mg  5 mg Oral DAILY    escitalopram oxalate (LEXAPRO) tablet 10 mg  10 mg Oral DAILY    amLODIPine (NORVASC) tablet 5 mg  5 mg Oral DAILY    donepezil (ARICEPT) tablet 10 mg  10 mg Oral QHS    . PHARMACY TO SUBSTITUTE PER PROTOCOL    Per Protocol    sodium chloride (NS) flush 5-10 mL  5-10 mL IntraVENous Q8H    sodium chloride (NS) flush 5-10 mL  5-10 mL IntraVENous PRN    acetaminophen (TYLENOL) tablet 650 mg  650 mg Oral Q4H PRN    HYDROcodone-acetaminophen (NORCO) 5-325 mg per tablet 1 Tab  1 Tab Oral Q4H PRN    naloxone (NARCAN) injection 0.4 mg  0.4 mg IntraVENous PRN    cefTRIAXone (ROCEPHIN) 1 g in 0.9% sodium chloride (MBP/ADV) 50 mL  1 g IntraVENous Q24H       Relevant other informations:      Other medical conditions listed in [facility] problem list section; all of these and other pertinent data were taken into consideration when treatment plan is developed and customized to this patient's unique overall circumstances and needs. We have reviewed available old medical records within the constraints of this admission process. Data Review:   Recent Days:  All Micro Results     Procedure Component Value Units Date/Time    CULTURE, BLOOD [[id number]] Collected:  05/27/17 1812    Order Status:  Completed Specimen:  Blood from Blood Updated:  05/28/17 0657     Special Requests: NO SPECIAL REQUESTS        Culture result: NO GROWTH AFTER 12 HOURS       CULTURE, URINE [[id number]] Collected:  05/27/17 1651    Order Status:  Completed Updated:  05/27/17 2333          Recent Labs      05/28/17 0323 05/27/17   1448   WBC  13.6*  24.3*   HGB  15.5  15.5   HCT  45.7  44.1   PLT  129*  150     Recent Labs      05/28/17 0323 05/27/17   1448   NA  139  138   K  2.9*  3.2*   CL  103  103   CO2  25  25   GLU  77  93   BUN  59*  68*   CREA  1.54*  1.96*   CA  8.3*  8.6   ALB   --   2.4*   TBILI   --   0.7   SGOT   --   42*   ALT   --   31      Lab Results   Component Value Date/Time    TSH 4.600 07/14/2016 11:16 AM            Care Plan discussed with:Patient/Family and Nurse   Other medical conditions are listed in the active hospital problem list section; these and other pertinent data were taken into consideration when the treatment plan was developed and customized to this patient's unique overall circumstances and needs.     High complexity decision making was performed for this patient who is at high risk for decompensation with multiple organ involvement.    Today total floor/unit time was 45  minutes while caring for this patient and greater than 50% of that time was spent with patient (and/or family) coordinating patients clinical issues; this includes time spent during multidisciplinary rounds. Aga Arciniega MD MPH FACP    5/28/2017      Statement Selected

## 2021-04-08 NOTE — ADDENDUM NOTE
Addended by: Junior Valentin on: 1/27/2017 04:26 PM     Modules accepted: Kirk Addended by: MANUEL VASQUEZ on: 4/8/2021 04:09 PM     Modules accepted: Orders